# Patient Record
Sex: MALE | Race: WHITE | NOT HISPANIC OR LATINO | Employment: OTHER | URBAN - METROPOLITAN AREA
[De-identification: names, ages, dates, MRNs, and addresses within clinical notes are randomized per-mention and may not be internally consistent; named-entity substitution may affect disease eponyms.]

---

## 2019-04-29 ENCOUNTER — MEDICAL CORRESPONDENCE (OUTPATIENT)
Dept: HEALTH INFORMATION MANAGEMENT | Facility: CLINIC | Age: 64
End: 2019-04-29

## 2019-04-29 ENCOUNTER — TRANSFERRED RECORDS (OUTPATIENT)
Dept: HEALTH INFORMATION MANAGEMENT | Facility: CLINIC | Age: 64
End: 2019-04-29

## 2019-05-28 ENCOUNTER — TRANSFERRED RECORDS (OUTPATIENT)
Dept: HEALTH INFORMATION MANAGEMENT | Facility: CLINIC | Age: 64
End: 2019-05-28

## 2019-05-28 LAB
ALT SERPL-CCNC: 30 U/L (ref 14–63)
AST SERPL-CCNC: 20 U/L (ref 15–37)
CREAT SERPL-MCNC: 0.93 MG/DL (ref 0.67–1.17)
EJECTION FRACTION: NORMAL %
GFR SERPL CREATININE-BSD FRML MDRD: >60 ML/MIN/1.73ME2 (ref 60–150)
GLUCOSE SERPL-MCNC: 114 MG/DL (ref 74–100)
POTASSIUM SERPL-SCNC: 3.7 MMOL/L (ref 3.5–5.1)

## 2019-05-29 ENCOUNTER — TRANSFERRED RECORDS (OUTPATIENT)
Dept: HEALTH INFORMATION MANAGEMENT | Facility: CLINIC | Age: 64
End: 2019-05-29

## 2019-05-30 ENCOUNTER — TRANSFERRED RECORDS (OUTPATIENT)
Dept: HEALTH INFORMATION MANAGEMENT | Facility: CLINIC | Age: 64
End: 2019-05-30

## 2019-06-27 ENCOUNTER — TRANSFERRED RECORDS (OUTPATIENT)
Dept: HEALTH INFORMATION MANAGEMENT | Facility: CLINIC | Age: 64
End: 2019-06-27

## 2019-06-27 LAB
CREAT SERPL-MCNC: 0.94 MG/DL (ref 0.67–1.17)
GFR SERPL CREATININE-BSD FRML MDRD: >60 ML/MIN/1.73M^2 (ref 60–150)
GLUCOSE SERPL-MCNC: 109 MG/DL (ref 74–100)
POTASSIUM SERPL-SCNC: 4.3 MMOL/L (ref 3.5–5.1)

## 2020-01-28 ENCOUNTER — TRANSFERRED RECORDS (OUTPATIENT)
Dept: HEALTH INFORMATION MANAGEMENT | Facility: CLINIC | Age: 65
End: 2020-01-28

## 2020-02-10 ENCOUNTER — TRANSFERRED RECORDS (OUTPATIENT)
Dept: HEALTH INFORMATION MANAGEMENT | Facility: CLINIC | Age: 65
End: 2020-02-10

## 2020-07-29 ENCOUNTER — VIRTUAL VISIT (OUTPATIENT)
Dept: CARDIOLOGY | Facility: CLINIC | Age: 65
End: 2020-07-29
Payer: MEDICARE

## 2020-07-29 VITALS — WEIGHT: 235 LBS

## 2020-07-29 DIAGNOSIS — E78.5 HYPERLIPIDEMIA LDL GOAL <100: ICD-10-CM

## 2020-07-29 DIAGNOSIS — I10 BENIGN ESSENTIAL HYPERTENSION: ICD-10-CM

## 2020-07-29 DIAGNOSIS — I25.10 CORONARY ARTERY DISEASE INVOLVING NATIVE HEART WITHOUT ANGINA PECTORIS, UNSPECIFIED VESSEL OR LESION TYPE: Primary | ICD-10-CM

## 2020-07-29 PROBLEM — E78.2 MIXED HYPERLIPIDEMIA: Status: ACTIVE | Noted: 2020-01-28

## 2020-07-29 PROCEDURE — 99442 ZZC PHYSICIAN TELEPHONE EVALUATION 11-20 MIN: CPT | Performed by: INTERNAL MEDICINE

## 2020-07-29 RX ORDER — MULTIPLE VITAMINS W/ MINERALS TAB 9MG-400MCG
TAB ORAL DAILY
COMMUNITY

## 2020-07-29 RX ORDER — RAMIPRIL 5 MG/1
5 CAPSULE ORAL DAILY
COMMUNITY
Start: 2019-06-27 | End: 2020-10-19

## 2020-07-29 RX ORDER — GEMFIBROZIL 600 MG/1
600 TABLET, FILM COATED ORAL
COMMUNITY
End: 2020-10-19

## 2020-07-29 RX ORDER — AMOXICILLIN 500 MG
1200 CAPSULE ORAL 3 TIMES DAILY
COMMUNITY

## 2020-07-29 RX ORDER — EZETIMIBE 10 MG/1
10 TABLET ORAL DAILY
COMMUNITY
Start: 2020-02-03 | End: 2020-12-14

## 2020-07-29 RX ORDER — NITROGLYCERIN 0.4 MG/1
0.4 TABLET SUBLINGUAL EVERY 5 MIN PRN
COMMUNITY
End: 2022-08-03

## 2020-07-29 RX ORDER — ROSUVASTATIN CALCIUM 40 MG/1
40 TABLET, COATED ORAL DAILY
COMMUNITY
Start: 2020-02-03 | End: 2020-12-14

## 2020-07-29 RX ORDER — ACETAMINOPHEN 160 MG
2000 TABLET,DISINTEGRATING ORAL DAILY
COMMUNITY
Start: 2019-06-27

## 2020-07-29 RX ORDER — DIMENHYDRINATE 50 MG
1 TABLET ORAL DAILY
COMMUNITY

## 2020-07-29 RX ORDER — METOPROLOL SUCCINATE 50 MG/1
50 TABLET, EXTENDED RELEASE ORAL DAILY
COMMUNITY
Start: 2019-11-06 | End: 2021-03-19

## 2020-07-29 NOTE — LETTER
7/29/2020    [facility]  [address] [facility] [address]    RE: Willie Jarrett       Dear Colleague,    I had the pleasure of seeing Willie Jarrett in the [facility].    CARDIOLOGY CONSULT    REASON FOR CONSULT: coronary artery disease    PRIMARY CARE PHYSICIAN:  [facility]    HISTORY OF PRESENT ILLNESS:  Mr. Osei Jarrett is a pleasant 64-year-old male with past medical history significant for coronary artery disease, s/p multiple PCIs including PCI of the mid LAD (on 3/09/2001) with thrombectomy and PCI of the proximal LCX with kissing balloon to OM (on 6/03/2008) for acute inferior MI complicated by out of hospital cardiac arrest and most recent PCI with MARIANN x1 to the mid RCA (5/29/2019), hypertension, hyperlipidemia, diabetes mellitus (diet controlled), history of tobacco use (quit in 2008) .  Due to the COVID 19, this visit is conducted via telephone, with Willie's consent.  Today, Willie reports feeling very well.  He and his wife recently moved from [place] to the [facility] to be closer to family.  They now have several acres of land and are busy settling in.  He is quite active outside and denies any exertional chest pain, chest discomfort or shortness of breath.  He was switched from atorvastatin to rosuvastatin and Zetia and his LDL is now at goal.  He wonders if he still needs to take the Brilinta as it has been over a year since his PCI.  He does note he gets short of breath when he bends over to pick something up or tie his shoes.          PAST MEDICAL HISTORY:  1.  Coronary artery disease:  S/P multiple PCIs of mid LAD (2001), PCI of proximal LCx with kissing balloon to OM (2008) for acute inferior MI complicated by out of hospital cardiac arrest, PCI with MARIANN to RCA (5/2019).  Normal LV function by echocardiogram 5/2019 EF 60-65% with basal inferior wall hypokinesis  2.  Hypertension  3.  Hyperlipidemia  4.  Diabetes type II    MEDICATIONS:  Current Outpatient  Medications   Medication     ASPIRIN 81 PO     Cholecalciferol (VITAMIN D3) 50 MCG (2000 UT) CAPS     ezetimibe (ZETIA) 10 MG tablet     Flaxseed, Linseed, (FLAX SEED OIL) 1000 MG capsule     gemfibrozil (LOPID) 600 MG tablet     metoprolol succinate ER (TOPROL-XL) 50 MG 24 hr tablet     Omega-3 Fatty Acids (FISH OIL) 1200 MG capsule     ramipril (ALTACE) 5 MG capsule     rosuvastatin (CRESTOR) 40 MG tablet     ticagrelor (BRILINTA) 90 MG tablet     multivitamin w/minerals (MULTI-VITAMIN) tablet     nitroGLYcerin (NITROSTAT) 0.4 MG sublingual tablet     No current facility-administered medications for this visit.        ALLERGIES:  No Known Allergies    SOCIAL HISTORY:  I have reviewed this patient's social history and updated it with pertinent information if needed. Willie Jarrett      FAMILY HISTORY:  I have reviewed this patient's family history and updated it with pertinent information if needed.   No family history on file.    REVIEW OF SYSTEMS:  A complete ROS was obtained and the pertinent positives are outlined in the history of present illness above.  The remainder of systems is negative.    PHYSICAL EXAM:  Patient reported vitals   Weight:  235 pounds        DATA:  Reviewed in EPIC    ASSESSMENT:  1.  Coronary artery disease:  Multiple PCIs as outlined above.  Echocardiogram with normal LV function, basal inferior WMA by echo in 2019.  Currently stable without symptoms concerning for obstructive CAD  2.  Hypertension  3.  Hyperlipidemia:  At goal on combination zetia and crestor  4.  Diabetes type II    RECOMMENDATIONS:  1. Okay to stop brilinta.  Continue ASA indefinitely.  2. Continue zetia, crestor.    3.  Reviewed importance of regular exercise and a heart healthy diet.   4.   Plan for follow up in one year or before than as necessary.      Julita Guzmán MD  Cardiology - [facility]  Pager:  [phone]  July 29, 2020        Phone call duration: 11 minutes  Total provider time:  20 minutes      Thank you  for allowing me to participate in the care of your patient.    Sincerely,     Julita Guzmán MD     [facility]

## 2020-07-29 NOTE — PROGRESS NOTES
"  The patient has been notified of following:     \"This telephone visit will be conducted via a call between you and your physician/provider. We have found that certain health care needs can be provided without the need for a physical exam.  This service lets us provide the care you need with a short phone conversation.  If a prescription is necessary we can send it directly to your pharmacy.  If lab work is needed we can place an order for that and you can then stop by our lab to have the test done at a later time.    Telephone visits are billed at different rates depending on your insurance coverage. During this emergency period, for some insurers they may be billed the same as an in-person visit.  Please reach out to your insurance provider with any questions.    If during the course of the call the physician/provider feels a telephone visit is not appropriate, you will not be charged for this service.\"    Patient has given verbal consent for Telephone visit?  Yes    What phone number would you like to be contacted at? [phone]    How would you like to obtain your AVS? Mail a copy    CARDIOLOGY CONSULT    REASON FOR CONSULT: coronary artery disease    PRIMARY CARE PHYSICIAN:  [facility]    HISTORY OF PRESENT ILLNESS:  Mr. Osei Jarrett is a pleasant 64-year-old male with past medical history significant for coronary artery disease, s/p multiple PCIs including PCI of the mid LAD (on 3/09/2001) with thrombectomy and PCI of the proximal LCX with kissing balloon to OM (on 6/03/2008) for acute inferior MI complicated by out of hospital cardiac arrest and most recent PCI with MARIANN x1 to the mid RCA (5/29/2019), hypertension, hyperlipidemia, diabetes mellitus (diet controlled), history of tobacco use (quit in 2008) .  Due to the COVID 19, this visit is conducted via telephone, with Willie's consent.  Today, Willie reports feeling very well.  He and his wife recently moved from [place] to the [facility] to be " closer to family.  They now have several acres of land and are busy settling in.  He is quite active outside and denies any exertional chest pain, chest discomfort or shortness of breath.  He was switched from atorvastatin to rosuvastatin and Zetia and his LDL is now at goal.  He wonders if he still needs to take the Brilinta as it has been over a year since his PCI.  He does note he gets short of breath when he bends over to pick something up or tie his shoes.          PAST MEDICAL HISTORY:  1.  Coronary artery disease:  S/P multiple PCIs of mid LAD (2001), PCI of proximal LCx with kissing balloon to OM (2008) for acute inferior MI complicated by out of hospital cardiac arrest, PCI with MARIANN to RCA (5/2019).  Normal LV function by echocardiogram 5/2019 EF 60-65% with basal inferior wall hypokinesis  2.  Hypertension  3.  Hyperlipidemia  4.  Diabetes type II    MEDICATIONS:  Current Outpatient Medications   Medication     ASPIRIN 81 PO     Cholecalciferol (VITAMIN D3) 50 MCG (2000 UT) CAPS     ezetimibe (ZETIA) 10 MG tablet     Flaxseed, Linseed, (FLAX SEED OIL) 1000 MG capsule     gemfibrozil (LOPID) 600 MG tablet     metoprolol succinate ER (TOPROL-XL) 50 MG 24 hr tablet     Omega-3 Fatty Acids (FISH OIL) 1200 MG capsule     ramipril (ALTACE) 5 MG capsule     rosuvastatin (CRESTOR) 40 MG tablet     ticagrelor (BRILINTA) 90 MG tablet     multivitamin w/minerals (MULTI-VITAMIN) tablet     nitroGLYcerin (NITROSTAT) 0.4 MG sublingual tablet     No current facility-administered medications for this visit.        ALLERGIES:  No Known Allergies    SOCIAL HISTORY:  I have reviewed this patient's social history and updated it with pertinent information if needed. Willie Jarrett      FAMILY HISTORY:  I have reviewed this patient's family history and updated it with pertinent information if needed.   No family history on file.    REVIEW OF SYSTEMS:  A complete ROS was obtained and the pertinent positives are outlined in the  history of present illness above.  The remainder of systems is negative.    PHYSICAL EXAM:  Patient reported vitals   Weight:  235 pounds        DATA:  Reviewed in EPIC    ASSESSMENT:  1.  Coronary artery disease:  Multiple PCIs as outlined above.  Echocardiogram with normal LV function, basal inferior WMA by echo in 2019.  Currently stable without symptoms concerning for obstructive CAD  2.  Hypertension  3.  Hyperlipidemia:  At goal on combination zetia and crestor  4.  Diabetes type II    RECOMMENDATIONS:  1. Okay to stop brilinta.  Continue ASA indefinitely.  2. Continue zetia, crestor.    3.  Reviewed importance of regular exercise and a heart healthy diet.   4.   Plan for follow up in one year or before than as necessary.      Julita Guzmán MD  Cardiology - [facility]  Pager:  [phone]  July 29, 2020        Phone call duration: 11 minutes  Total provider time:  20 minutes

## 2020-08-03 ENCOUNTER — OFFICE VISIT (OUTPATIENT)
Dept: FAMILY MEDICINE | Facility: CLINIC | Age: 65
End: 2020-08-03
Payer: MEDICARE

## 2020-08-03 VITALS
OXYGEN SATURATION: 97 % | SYSTOLIC BLOOD PRESSURE: 108 MMHG | HEIGHT: 72 IN | RESPIRATION RATE: 16 BRPM | WEIGHT: 239 LBS | DIASTOLIC BLOOD PRESSURE: 72 MMHG | TEMPERATURE: 96.7 F | BODY MASS INDEX: 32.37 KG/M2 | HEART RATE: 68 BPM

## 2020-08-03 DIAGNOSIS — Z12.5 SCREENING FOR PROSTATE CANCER: ICD-10-CM

## 2020-08-03 DIAGNOSIS — I10 BENIGN ESSENTIAL HYPERTENSION: ICD-10-CM

## 2020-08-03 DIAGNOSIS — Z12.11 SCREEN FOR COLON CANCER: ICD-10-CM

## 2020-08-03 DIAGNOSIS — E78.2 MIXED HYPERLIPIDEMIA: ICD-10-CM

## 2020-08-03 DIAGNOSIS — I25.10 ATHEROSCLEROSIS OF NATIVE CORONARY ARTERY OF NATIVE HEART WITHOUT ANGINA PECTORIS: Primary | ICD-10-CM

## 2020-08-03 LAB
ALBUMIN SERPL-MCNC: 4.1 G/DL (ref 3.4–5)
ALBUMIN UR-MCNC: 30 MG/DL
ALP SERPL-CCNC: 66 U/L (ref 40–150)
ALT SERPL W P-5'-P-CCNC: 49 U/L (ref 0–70)
ANION GAP SERPL CALCULATED.3IONS-SCNC: 3 MMOL/L (ref 3–14)
APPEARANCE UR: CLEAR
AST SERPL W P-5'-P-CCNC: 28 U/L (ref 0–45)
BILIRUB DIRECT SERPL-MCNC: 0.2 MG/DL (ref 0–0.2)
BILIRUB SERPL-MCNC: 0.5 MG/DL (ref 0.2–1.3)
BILIRUB UR QL STRIP: NEGATIVE
BUN SERPL-MCNC: 21 MG/DL (ref 7–30)
CALCIUM SERPL-MCNC: 9.5 MG/DL (ref 8.5–10.1)
CHLORIDE SERPL-SCNC: 112 MMOL/L (ref 94–109)
CHOLEST SERPL-MCNC: 118 MG/DL
CO2 SERPL-SCNC: 27 MMOL/L (ref 20–32)
COLOR UR AUTO: YELLOW
CREAT SERPL-MCNC: 1.03 MG/DL (ref 0.66–1.25)
GFR SERPL CREATININE-BSD FRML MDRD: 75 ML/MIN/{1.73_M2}
GLUCOSE SERPL-MCNC: 113 MG/DL (ref 70–99)
GLUCOSE UR STRIP-MCNC: NEGATIVE MG/DL
HDLC SERPL-MCNC: 43 MG/DL
HGB UR QL STRIP: NEGATIVE
KETONES UR STRIP-MCNC: NEGATIVE MG/DL
LDLC SERPL CALC-MCNC: 45 MG/DL
LEUKOCYTE ESTERASE UR QL STRIP: NEGATIVE
MUCOUS THREADS #/AREA URNS LPF: PRESENT /LPF
NITRATE UR QL: NEGATIVE
NONHDLC SERPL-MCNC: 75 MG/DL
PH UR STRIP: 5 PH (ref 5–7)
POTASSIUM SERPL-SCNC: 4.6 MMOL/L (ref 3.4–5.3)
PROT SERPL-MCNC: 7.7 G/DL (ref 6.8–8.8)
PSA SERPL-ACNC: 0.24 UG/L (ref 0–4)
RBC #/AREA URNS AUTO: 1 /HPF (ref 0–2)
SODIUM SERPL-SCNC: 142 MMOL/L (ref 133–144)
SOURCE: ABNORMAL
SP GR UR STRIP: 1.02 (ref 1–1.03)
SQUAMOUS #/AREA URNS AUTO: <1 /HPF (ref 0–1)
TRIGL SERPL-MCNC: 149 MG/DL
UROBILINOGEN UR STRIP-MCNC: 0 MG/DL (ref 0–2)
WBC #/AREA URNS AUTO: 1 /HPF (ref 0–5)

## 2020-08-03 PROCEDURE — G0103 PSA SCREENING: HCPCS | Performed by: INTERNAL MEDICINE

## 2020-08-03 PROCEDURE — G0438 PPPS, INITIAL VISIT: HCPCS | Performed by: INTERNAL MEDICINE

## 2020-08-03 PROCEDURE — 80076 HEPATIC FUNCTION PANEL: CPT | Performed by: INTERNAL MEDICINE

## 2020-08-03 PROCEDURE — 81001 URINALYSIS AUTO W/SCOPE: CPT | Performed by: INTERNAL MEDICINE

## 2020-08-03 PROCEDURE — 36415 COLL VENOUS BLD VENIPUNCTURE: CPT | Performed by: INTERNAL MEDICINE

## 2020-08-03 PROCEDURE — 80061 LIPID PANEL: CPT | Performed by: INTERNAL MEDICINE

## 2020-08-03 PROCEDURE — 80048 BASIC METABOLIC PNL TOTAL CA: CPT | Performed by: INTERNAL MEDICINE

## 2020-08-03 ASSESSMENT — MIFFLIN-ST. JEOR: SCORE: 1902.1

## 2020-08-03 ASSESSMENT — ACTIVITIES OF DAILY LIVING (ADL): CURRENT_FUNCTION: NO ASSISTANCE NEEDED

## 2020-08-03 ASSESSMENT — PAIN SCALES - GENERAL: PAINLEVEL: NO PAIN (0)

## 2020-08-03 NOTE — H&P (VIEW-ONLY)
"SUBJECTIVE:   Willie Jarrett is a 64 year old male who presents for Preventive Visit.  Are you in the first 12 months of your Medicare coverage?  No    Healthy Habits:     In general, how would you rate your overall health?  Good    Frequency of exercise:  None    Do you usually eat at least 4 servings of fruit and vegetables a day, include whole grains    & fiber and avoid regularly eating high fat or \"junk\" foods?  No    Taking medications regularly:  Yes    Medication side effects:  None    Ability to successfully perform activities of daily living:  No assistance needed    Home Safety:  No safety concerns identified    Hearing Impairment:  No hearing concerns    In the past 6 months, have you been bothered by leaking of urine?  No    In general, how would you rate your overall mental or emotional health?  Excellent      PHQ-2 Total Score: 0    Additional concerns today:  No    Do you feel safe in your environment? Yes    Have you ever done Advance Care Planning? (For example, a Health Directive, POLST, or a discussion with a medical provider or your loved ones about your wishes): No, advance care planning information given to patient to review.  Patient plans to discuss their wishes with loved ones or provider.        Fall risk  Fallen 2 or more times in the past year?: No  Any fall with injury in the past year?: No    Cognitive Screening   1) Repeat 3 items (Leader, Season, Table)    2) Clock draw: NORMAL  3) 3 item recall: Recalls 3 objects  Results: 3 items recalled: COGNITIVE IMPAIRMENT LESS LIKELY    Mini-CogTM Copyright MARSHA Major. Licensed by the author for use in [facility]; reprinted with permission (ebony@.[facility]). All rights reserved.      Do you have sleep apnea, excessive snoring or daytime drowsiness?: no    Reviewed and updated as needed this visit by clinical staff  Tobacco  Allergies  Meds         Reviewed and updated as needed this visit by Provider        Social History     Tobacco Use "     Smoking status: Former Smoker     Smokeless tobacco: Never Used   Substance Use Topics     Alcohol use: Yes         Alcohol Use 8/3/2020   Prescreen: >3 drinks/day or >7 drinks/week? No           -------------------------------------    Current providers sharing in care for this patient include:   Patient Care Team:  Clinic, [facility] as PCP - General (Gastroenterology)    The following health maintenance items are reviewed in Epic and correct as of today:  Health Maintenance   Topic Date Due     HEPATITIS C SCREENING  1955     ADVANCE CARE PLANNING  1955     COLORECTAL CANCER SCREENING  10/27/1963     ZOSTER IMMUNIZATION (1 of 2) 10/27/2003     LIPID  06/04/2013     MEDICARE ANNUAL WELLNESS VISIT  10/27/2018     FALL RISK ASSESSMENT  10/27/2018     PNEUMOCOCCAL IMMUNIZATION 65+ LOW/MEDIUM RISK (1 of 2 - PCV13) 10/27/2018     AORTIC ANEURYSM SCREENING (SYSTEM ASSIGNED)  10/27/2018     PHQ-2  01/01/2020     INFLUENZA VACCINE (1) 09/01/2020     DTAP/TDAP/TD IMMUNIZATION (2 - Td) 07/15/2023     IPV IMMUNIZATION  Aged Out     MENINGITIS IMMUNIZATION  Aged Out     HEPATITIS B IMMUNIZATION  Aged Out     Lab work is in process  BP Readings from Last 3 Encounters:   08/03/20 108/72    Wt Readings from Last 3 Encounters:   08/03/20 108.4 kg (239 lb)   07/29/20 106.6 kg (235 lb)                  Patient Active Problem List   Diagnosis     Coronary atherosclerosis     Encounter for colonoscopy in patient with family history of colon cancer     Mixed hyperlipidemia     Postsurgical percutaneous transluminal coronary angioplasty status     Hyperlipidemia     No past surgical history on file.    Social History     Tobacco Use     Smoking status: Former Smoker     Smokeless tobacco: Never Used   Substance Use Topics     Alcohol use: Yes     No family history on file.      Current Outpatient Medications   Medication Sig Dispense Refill     ASPIRIN 81 PO        Cholecalciferol (VITAMIN D3) 50 MCG (2000 UT) CAPS Take  2,000 Units by mouth daily       ezetimibe (ZETIA) 10 MG tablet Take 10 mg by mouth daily       Flaxseed, Linseed, (FLAX SEED OIL) 1000 MG capsule Take 1 capsule by mouth daily Taking 1400mg twice daily       gemfibrozil (LOPID) 600 MG tablet Take 600 mg by mouth 2 times daily (before meals)       metoprolol succinate ER (TOPROL-XL) 50 MG 24 hr tablet Take 50 mg by mouth daily       multivitamin w/minerals (MULTI-VITAMIN) tablet Take by mouth daily       Omega-3 Fatty Acids (FISH OIL) 1200 MG capsule Take 1,200 mg by mouth 3 times daily       ramipril (ALTACE) 5 MG capsule Take 5 mg by mouth daily       rosuvastatin (CRESTOR) 40 MG tablet Take 40 mg by mouth daily       ticagrelor (BRILINTA) 90 MG tablet Take 90 mg by mouth 2 times daily       nitroGLYcerin (NITROSTAT) 0.4 MG sublingual tablet Place 0.4 mg under the tongue every 5 minutes as needed for chest pain For chest pain place 1 tablet under the tongue every 5 minutes for 3 doses. If symptoms persist 5 minutes after 1st dose call 911.       No Known Allergies      Review of Systems  CONSTITUTIONAL: NEGATIVE for fever, chills, change in weight  INTEGUMENTARY/SKIN: NEGATIVE for worrisome rashes, moles or lesions  EYES: NEGATIVE for vision changes or irritation  ENT/MOUTH: NEGATIVE for ear, mouth and throat problems  RESP: NEGATIVE for significant cough or SOB  CV: Patient denies chest pain, arrhythmia, syncope.  His history of previous angioplasties about 5 years ago.  GI: NEGATIVE for nausea, abdominal pain, heartburn, or change in bowel habits.  History of benign colon polyps 3 years ago.  Due for colonoscopy.  : NEGATIVE for frequency, dysuria, or hematuria  MUSCULOSKELETAL: NEGATIVE for significant arthralgias or myalgia  NEURO: NEGATIVE for weakness, dizziness or paresthesias  ENDOCRINE: NEGATIVE for temperature intolerance, skin/hair changes  HEME: NEGATIVE for bleeding problems  PSYCHIATRIC: NEGATIVE for changes in mood or affect    OBJECTIVE:   BP  108/72 (BP Location: Right arm, Patient Position: Sitting, Cuff Size: Adult Regular)   Pulse 68   Temp 96.7  F (35.9  C) (Temporal)   Resp 16   Ht 1.829 m (6')   Wt 108.4 kg (239 lb)   SpO2 97%   BMI 32.41 kg/m   Estimated body mass index is 32.41 kg/m  as calculated from the following:    Height as of this encounter: 1.829 m (6').    Weight as of this encounter: 108.4 kg (239 lb).  Physical Exam  GENERAL: healthy, alert and no distress  EYES: Eyes grossly normal to inspection, PERRL and conjunctivae and sclerae normal  HENT: ear canals and TM's normal, nose and mouth without ulcers or lesions  NECK: no adenopathy, no asymmetry, masses, or scars and thyroid normal to palpation  RESP: lungs clear to auscultation - no rales, rhonchi or wheezes  CV: regular rate and rhythm, normal S1 S2, no S3 or S4, no murmur, click or rub, no peripheral edema and peripheral pulses strong  ABDOMEN: soft, nontender, no hepatosplenomegaly, no masses and bowel sounds normal  MS: no gross musculoskeletal defects noted, no edema  SKIN: no suspicious lesions or rashes  NEURO: Normal strength and tone, mentation intact and speech normal  PSYCH: mentation appears normal, affect normal/bright    Diagnostic Test Results:  No results found for this or any previous visit (from the past 24 hour(s)).    ASSESSMENT / PLAN:       ICD-10-CM    1. Atherosclerosis of native coronary artery of native heart without angina pectoris  I25.10 Hepatic panel   2. Mixed hyperlipidemia  E78.2 Lipid Profile   3. Benign essential hypertension  I10 Basic metabolic panel  (Ca, Cl, CO2, Creat, Gluc, K, Na, BUN)     *UA reflex to Microscopic and Culture ([place]; [facility]; [facility]; [employer]; [facility]; [facility]; [place]; [place])   4. Screening for prostate cancer  Z12.5 PSA, screen   5. Screen for colon cancer  Z12.11 GASTROENTEROLOGY ADULT REF PROCEDURE ONLY       COUNSELING:  Reviewed preventive health counseling, as reflected  in patient instructions       Regular exercise       Healthy diet/nutrition       Vision screening       Hearing screening       Dental care       Bladder control       Colon cancer screening       Prostate cancer screening    Estimated body mass index is 32.41 kg/m  as calculated from the following:    Height as of this encounter: 1.829 m (6').    Weight as of this encounter: 108.4 kg (239 lb).    Weight management plan: Discussed healthy diet and exercise guidelines     reports that he has quit smoking. He has never used smokeless tobacco.      Appropriate preventive services were discussed with this patient, including applicable screening as appropriate for cardiovascular disease, diabetes, osteopenia/osteoporosis, and glaucoma.  As appropriate for age/gender, discussed screening for colorectal cancer, prostate cancer, breast cancer, and cervical cancer. Checklist reviewing preventive services available has been given to the patient.    Reviewed patients plan of care and provided an AVS. The Basic Care Plan (routine screening as documented in Health Maintenance) for Willie meets the Care Plan requirement. This Care Plan has been established and reviewed with the Patient.    Counseling Resources:  ATP IV Guidelines  Pooled Cohorts Equation Calculator  Breast Cancer Risk Calculator  FRAX Risk Assessment  ICSI Preventive Guidelines  Dietary Guidelines for Americans, 2010  USDA's MyPlate  ASA Prophylaxis  Lung CA Screening    Devon Amin DO  [facility]    Identified Health Risks:

## 2020-08-03 NOTE — LETTER
August 6, 2020      Willie Jarrett  [address]   [place] [address]        Dear ,    We are writing to inform you of your test results.    The urine sample is unremarkable.   Prostate-specific antigen is consistent with a low risk of prostate cancer.   Liver tests are normal.   Cholesterol is well controlled with an LDL of 45.   Chemistry panel shows a minimally elevated blood sugar of 113 which is normal in a nonfasting state.     You will be contacted with any outstanding results as they are available.   Feel free to contact me via the office or My Chart if you have any questions regarding the above.     Resulted Orders   Lipid Profile   Result Value Ref Range    Cholesterol 118 <200 mg/dL    Triglycerides 149 <150 mg/dL    HDL Cholesterol 43 >39 mg/dL    LDL Cholesterol Calculated 45 <100 mg/dL      Comment:      Desirable:       <100 mg/dl    Non HDL Cholesterol 75 <130 mg/dL   Basic metabolic panel  (Ca, Cl, CO2, Creat, Gluc, K, Na, BUN)   Result Value Ref Range    Sodium 142 133 - 144 mmol/L    Potassium 4.6 3.4 - 5.3 mmol/L    Chloride 112 (H) 94 - 109 mmol/L    Carbon Dioxide 27 20 - 32 mmol/L    Anion Gap 3 3 - 14 mmol/L    Glucose 113 (H) 70 - 99 mg/dL    Urea Nitrogen 21 7 - 30 mg/dL    Creatinine 1.03 0.66 - 1.25 mg/dL    GFR Estimate 75 >60 mL/min/[1.73_m2]      Comment:      Non  GFR Calc  Starting 12/18/2018, serum creatinine based estimated GFR (eGFR) will be   calculated using the Chronic Kidney Disease Epidemiology Collaboration   (CKD-EPI) equation.      GFR Estimate If Black 87 >60 mL/min/[1.73_m2]      Comment:       GFR Calc  Starting 12/18/2018, serum creatinine based estimated GFR (eGFR) will be   calculated using the Chronic Kidney Disease Epidemiology Collaboration   (CKD-EPI) equation.      Calcium 9.5 8.5 - 10.1 mg/dL   Hepatic panel   Result Value Ref Range    Bilirubin Direct 0.2 0.0 - 0.2 mg/dL    Bilirubin Total 0.5 0.2 - 1.3 mg/dL    Albumin  4.1 3.4 - 5.0 g/dL    Protein Total 7.7 6.8 - 8.8 g/dL    Alkaline Phosphatase 66 40 - 150 U/L    ALT 49 0 - 70 U/L    AST 28 0 - 45 U/L   PSA, screen   Result Value Ref Range    PSA 0.24 0 - 4 ug/L      Comment:      Assay Method:  Chemiluminescence using Siemens Vista analyzer   *UA reflex to Microscopic and Culture ([place]; [facility]; [facility]; [facility]; [facility] [place]; [facility]; [place]; [place])   Result Value Ref Range    Color Urine Yellow     Appearance Urine Clear     Glucose Urine Negative NEG^Negative mg/dL    Bilirubin Urine Negative NEG^Negative    Ketones Urine Negative NEG^Negative mg/dL    Specific Gravity Urine 1.016 1.003 - 1.035    Blood Urine Negative NEG^Negative    pH Urine 5.0 5.0 - 7.0 pH    Protein Albumin Urine 30 (A) NEG^Negative mg/dL    Urobilinogen mg/dL 0.0 0.0 - 2.0 mg/dL    Nitrite Urine Negative NEG^Negative    Leukocyte Esterase Urine Negative NEG^Negative    Source Unspecified Urine     RBC Urine 1 0 - 2 /HPF    WBC Urine 1 0 - 5 /HPF    Squamous Epithelial /HPF Urine <1 0 - 1 /HPF    Mucous Urine Present (A) NEG^Negative /LPF       If you have any questions or concerns, please call the clinic at the number listed above.       Sincerely,        Devon Amin DO

## 2020-08-06 ENCOUNTER — TELEPHONE (OUTPATIENT)
Dept: FAMILY MEDICINE | Facility: OTHER | Age: 65
End: 2020-08-06

## 2020-08-06 DIAGNOSIS — Z11.59 ENCOUNTER FOR SCREENING FOR OTHER VIRAL DISEASES: Primary | ICD-10-CM

## 2020-08-06 NOTE — RESULT ENCOUNTER NOTE
Dear Willie, your recent test results are attached.  The urine sample is unremarkable.  Prostate-specific antigen is consistent with a low risk of prostate cancer.  Liver tests are normal.  Cholesterol is well controlled with an LDL of 45.  Chemistry panel shows a minimally elevated blood sugar of 113 which is normal in a nonfasting state.    You will be contacted with any outstanding results as they are available.  Feel free to contact me via the office or My Chart if you have any questions regarding the above.

## 2020-08-06 NOTE — LETTER
[facility]  [address] [facility] [address]  [phone]        August 6, 2020    Willie Jarrett  [address] [facility] [address]

## 2020-08-06 NOTE — TELEPHONE ENCOUNTER
Date of colonoscopy/EGD: 8/20/20  Surgeon: Dr. Gerard  Prep:Miralax  Packet:Colonoscopy/EGD instructions mailed to patient's home address.   Date: 8/6/2020      Surgery Scheduler

## 2020-08-06 NOTE — LETTER

## 2020-08-17 DIAGNOSIS — Z11.59 ENCOUNTER FOR SCREENING FOR OTHER VIRAL DISEASES: ICD-10-CM

## 2020-08-17 PROCEDURE — U0003 INFECTIOUS AGENT DETECTION BY NUCLEIC ACID (DNA OR RNA); SEVERE ACUTE RESPIRATORY SYNDROME CORONAVIRUS 2 (SARS-COV-2) (CORONAVIRUS DISEASE [COVID-19]), AMPLIFIED PROBE TECHNIQUE, MAKING USE OF HIGH THROUGHPUT TECHNOLOGIES AS DESCRIBED BY CMS-2020-01-R: HCPCS | Performed by: SURGERY

## 2020-08-18 LAB
SARS-COV-2 RNA SPEC QL NAA+PROBE: NOT DETECTED
SPECIMEN SOURCE: NORMAL

## 2020-08-19 ENCOUNTER — ANESTHESIA EVENT (OUTPATIENT)
Dept: GASTROENTEROLOGY | Facility: CLINIC | Age: 65
End: 2020-08-19
Payer: MEDICARE

## 2020-08-19 SDOH — HEALTH STABILITY: MENTAL HEALTH: CURRENT SMOKER: 0

## 2020-08-19 ASSESSMENT — LIFESTYLE VARIABLES: TOBACCO_USE: 1

## 2020-08-19 NOTE — ANESTHESIA PREPROCEDURE EVALUATION
Anesthesia Pre-Procedure Evaluation    Patient: TAMMIE Jarrett   MRN: [id number] : 1955          Preoperative Diagnosis: Special screening for malignant neoplasms, colon [Z12.11]    Procedure(s):  Colonoscopy with possible biopsy and/or polypectomy    History reviewed. No pertinent past medical history.  History reviewed. No pertinent surgical history.    Anesthesia Evaluation     . Pt has had prior anesthetic. Type: MAC           ROS/MED HX    ENT/Pulmonary:     (+)tobacco use, Past use , . .    Neurologic:  - neg neurologic ROS     Cardiovascular:     (+) Dyslipidemia, --CAD, --stent,  1 Drug Eluting Stent .. : . . . :. . Previous cardiac testing Echodate:6-37-95vajbjtm:EF 60-65%date: results:ECG reviewed date:19 results:Sr-61 date: results:          METS/Exercise Tolerance:     Hematologic:  - neg hematologic  ROS       Musculoskeletal:  - neg musculoskeletal ROS       GI/Hepatic:     (+) bowel prep,       Renal/Genitourinary:  - ROS Renal section negative       Endo:  - neg endo ROS       Psychiatric:  - neg psychiatric ROS       Infectious Disease:  - neg infectious disease ROS       Malignancy:      - no malignancy   Other:    - neg other ROS                      Physical Exam  Normal systems: cardiovascular, pulmonary and dental    Airway   Mallampati: II  TM distance: <3 FB  Neck ROM: full    Dental     Cardiovascular   Rhythm and rate: regular and normal      Pulmonary    breath sounds clear to auscultation            Lab Results   Component Value Date    HGB 11.8 (L) 2008    HCT 34.9 (L) 2008     2008     2020    POTASSIUM 4.6 2020    CHLORIDE 112 (H) 2020    CO2 27 2020    BUN 21 2020    CR 1.03 2020     (H) 2020    RICHAR 9.5 2020    ALBUMIN 4.1 2020    PROTTOTAL 7.7 2020    ALT 49 2020    AST 28 2020    ALKPHOS 66 2020    BILITOTAL 0.5 2020    PTT 28 2008        Preop Vitals  BP Readings from Last 3 Encounters:   08/03/20 108/72    Pulse Readings from Last 3 Encounters:   08/03/20 68      Resp Readings from Last 3 Encounters:   08/03/20 16    SpO2 Readings from Last 3 Encounters:   08/03/20 97%      Temp Readings from Last 1 Encounters:   08/03/20 96.7  F (35.9  C) (Temporal)    Ht Readings from Last 1 Encounters:   08/03/20 1.829 m (6')      Wt Readings from Last 1 Encounters:   08/03/20 108.4 kg (239 lb)    Estimated body mass index is 32.41 kg/m  as calculated from the following:    Height as of 8/3/20: 1.829 m (6').    Weight as of 8/3/20: 108.4 kg (239 lb).       Anesthesia Plan      History & Physical Review  History and physical reviewed and following examination; no interval change.    ASA Status:  2 .    NPO Status:  > 6 hours    Plan for MAC with Intravenous and Propofol induction. Maintenance will be TIVA.  Reason for MAC:  Deep or markedly invasive procedure (G8)      The patient is not a current smoker      Postoperative Care      Consents  Anesthetic plan, risks, benefits and alternatives discussed with:  Patient.  Use of blood products discussed: No .   .                 ARI Morrow CRNA

## 2020-08-20 ENCOUNTER — HOSPITAL ENCOUNTER (OUTPATIENT)
Facility: CLINIC | Age: 65
Discharge: HOME | End: 2020-08-20
Attending: SURGERY | Admitting: SURGERY
Payer: MEDICARE

## 2020-08-20 ENCOUNTER — ANESTHESIA (OUTPATIENT)
Dept: GASTROENTEROLOGY | Facility: CLINIC | Age: 65
End: 2020-08-20
Payer: MEDICARE

## 2020-08-20 VITALS
DIASTOLIC BLOOD PRESSURE: 84 MMHG | OXYGEN SATURATION: 95 % | HEART RATE: 62 BPM | RESPIRATION RATE: 16 BRPM | TEMPERATURE: 97.8 F | SYSTOLIC BLOOD PRESSURE: 111 MMHG

## 2020-08-20 LAB — COLONOSCOPY: NORMAL

## 2020-08-20 PROCEDURE — 25800030 ZZH RX IP 258 OP 636: Performed by: SURGERY

## 2020-08-20 PROCEDURE — 45378 DIAGNOSTIC COLONOSCOPY: CPT | Performed by: SURGERY

## 2020-08-20 PROCEDURE — 25000125 ZZHC RX 250: Performed by: SURGERY

## 2020-08-20 PROCEDURE — 37000008 ZZH ANESTHESIA TECHNICAL FEE, 1ST 30 MIN: Performed by: SURGERY

## 2020-08-20 PROCEDURE — G0105 COLORECTAL SCRN; HI RISK IND: HCPCS | Performed by: SURGERY

## 2020-08-20 PROCEDURE — 37000009 ZZH ANESTHESIA TECHNICAL FEE, EACH ADDTL 15 MIN: Performed by: SURGERY

## 2020-08-20 PROCEDURE — 25000128 H RX IP 250 OP 636: Performed by: NURSE ANESTHETIST, CERTIFIED REGISTERED

## 2020-08-20 PROCEDURE — 25000125 ZZHC RX 250: Performed by: NURSE ANESTHETIST, CERTIFIED REGISTERED

## 2020-08-20 RX ORDER — PROPOFOL 10 MG/ML
INJECTION, EMULSION INTRAVENOUS PRN
Status: DISCONTINUED | OUTPATIENT
Start: 2020-08-20 | End: 2020-08-20

## 2020-08-20 RX ORDER — MEPERIDINE HYDROCHLORIDE 25 MG/ML
12.5 INJECTION INTRAMUSCULAR; INTRAVENOUS; SUBCUTANEOUS
Status: DISCONTINUED | OUTPATIENT
Start: 2020-08-20 | End: 2020-08-20 | Stop reason: HOSPADM

## 2020-08-20 RX ORDER — SODIUM CHLORIDE, SODIUM LACTATE, POTASSIUM CHLORIDE, CALCIUM CHLORIDE 600; 310; 30; 20 MG/100ML; MG/100ML; MG/100ML; MG/100ML
INJECTION, SOLUTION INTRAVENOUS CONTINUOUS
Status: DISCONTINUED | OUTPATIENT
Start: 2020-08-20 | End: 2020-08-20 | Stop reason: HOSPADM

## 2020-08-20 RX ORDER — LIDOCAINE 40 MG/G
CREAM TOPICAL
Status: DISCONTINUED | OUTPATIENT
Start: 2020-08-20 | End: 2020-08-20 | Stop reason: HOSPADM

## 2020-08-20 RX ORDER — ONDANSETRON 2 MG/ML
4 INJECTION INTRAMUSCULAR; INTRAVENOUS EVERY 30 MIN PRN
Status: DISCONTINUED | OUTPATIENT
Start: 2020-08-20 | End: 2020-08-20 | Stop reason: HOSPADM

## 2020-08-20 RX ORDER — PROPOFOL 10 MG/ML
INJECTION, EMULSION INTRAVENOUS CONTINUOUS PRN
Status: DISCONTINUED | OUTPATIENT
Start: 2020-08-20 | End: 2020-08-20

## 2020-08-20 RX ORDER — ONDANSETRON 4 MG/1
4 TABLET, ORALLY DISINTEGRATING ORAL EVERY 30 MIN PRN
Status: DISCONTINUED | OUTPATIENT
Start: 2020-08-20 | End: 2020-08-20 | Stop reason: HOSPADM

## 2020-08-20 RX ORDER — LIDOCAINE HYDROCHLORIDE 20 MG/ML
INJECTION, SOLUTION INFILTRATION; PERINEURAL PRN
Status: DISCONTINUED | OUTPATIENT
Start: 2020-08-20 | End: 2020-08-20

## 2020-08-20 RX ORDER — NALOXONE HYDROCHLORIDE 0.4 MG/ML
.1-.4 INJECTION, SOLUTION INTRAMUSCULAR; INTRAVENOUS; SUBCUTANEOUS
Status: DISCONTINUED | OUTPATIENT
Start: 2020-08-20 | End: 2020-08-20 | Stop reason: HOSPADM

## 2020-08-20 RX ADMIN — PROPOFOL 150 MCG/KG/MIN: 10 INJECTION, EMULSION INTRAVENOUS at 08:15

## 2020-08-20 RX ADMIN — SODIUM CHLORIDE, POTASSIUM CHLORIDE, SODIUM LACTATE AND CALCIUM CHLORIDE: 600; 310; 30; 20 INJECTION, SOLUTION INTRAVENOUS at 08:09

## 2020-08-20 RX ADMIN — PROPOFOL 70 MG: 10 INJECTION, EMULSION INTRAVENOUS at 08:15

## 2020-08-20 RX ADMIN — LIDOCAINE HYDROCHLORIDE 40 MG: 20 INJECTION, SOLUTION INFILTRATION; PERINEURAL at 08:15

## 2020-08-20 RX ADMIN — LIDOCAINE HYDROCHLORIDE 1 ML: 10 INJECTION, SOLUTION EPIDURAL; INFILTRATION; INTRACAUDAL; PERINEURAL at 08:09

## 2020-08-20 RX ADMIN — PROPOFOL 30 MG: 10 INJECTION, EMULSION INTRAVENOUS at 08:17

## 2020-08-20 NOTE — DISCHARGE INSTRUCTIONS
[facility]    Home Care Following Endoscopy          Activity:    You have just undergone an endoscopic procedure usually performed with conscious sedation.  Do not work or operate machinery (including a car) for at least 12 hours.      I encourage you to walk and attempt to pass this air as soon as possible.    Diet:    Return to the diet you were on before your procedure but eat lightly for the first 12-24 hours.    Drink plenty of water.    Resume any regular medications unless otherwise advised by your physician.  Please begin any new medication prescribed as a result of your procedure as directed by your physician.     If you had any biopsy or polyp removed please refrain from aspirin or aspirin products for 2 days.  If on Coumadin please restart as instructed by your physician.   Pain:    You may take Tylenol as needed for pain.  Expected Recovery:  You can expect some mild abdominal fullness and/or discomfort due to the air used to inflate your intestinal tract.     Call Your Physician if You Have:    After Colonoscopy:  o Worsening persisting abdominal pain which is worse with activity.  o Fevers (>101 degrees F), chills or shakes.  o Passage of continued blood with bowel movements.   Any questions or concerns about your recovery, please call [phone] or after hours [phone] Nurse Advice Line.    Follow-up Care:    You should receive a call or letter with your results within 1 week. Please call if you have not received a notification of your results.  If asked to return to clinic please make an appointment 1 week after your procedure.  Call [phone].

## 2020-08-20 NOTE — ANESTHESIA CARE TRANSFER NOTE
Patient: TAMMIE Jarrett    Procedure(s):  Colonoscopy    Diagnosis: Special screening for malignant neoplasms, colon [Z12.11]  Diagnosis Additional Information: No value filed.    Anesthesia Type:   MAC     Note:  Airway :Face Mask  Patient transferred to:Phase II  Handoff Report: Identifed the Patient, Identified the Reponsible Provider, Reviewed the pertinent medical history, Discussed the surgical course, Reviewed Intra-OP anesthesia mangement and issues during anesthesia, Set expectations for post-procedure period and Allowed opportunity for questions and acknowledgement of understanding      Vitals: (Last set prior to Anesthesia Care Transfer)    CRNA VITALS  8/20/2020 0806 - 8/20/2020 0841      8/20/2020             Resp Rate (observed):  (!) 3                Electronically Signed By: ARI Morrow CRNA  August 20, 2020  8:41 AM

## 2020-08-20 NOTE — ANESTHESIA POSTPROCEDURE EVALUATION
Patient: TAMMIE Jarrett    Procedure(s):  Colonoscopy    Diagnosis:Special screening for malignant neoplasms, colon [Z12.11]  Diagnosis Additional Information: No value filed.    Anesthesia Type:  MAC    Note:  Anesthesia Post Evaluation    Patient location during evaluation: Phase 2 and Bedside  Patient participation: Able to fully participate in evaluation  Level of consciousness: awake and alert  Pain management: adequate  Airway patency: patent  Cardiovascular status: acceptable  Respiratory status: acceptable  Hydration status: acceptable  PONV: none     Anesthetic complications: None          Last vitals:  Vitals:    08/20/20 0755 08/20/20 0839   BP: 126/86 113/68   Pulse:  72   Resp: 16 16   Temp: 97.8  F (36.6  C)    SpO2: 97% 99%         Electronically Signed By: ARI Morrow CRNA  August 20, 2020  8:42 AM

## 2020-08-20 NOTE — INTERVAL H&P NOTE
last colonoscopy in 2017 - 3 polyps at the time; ?famhx; ?PSH    The History and Physical has been reviewed, the patient has been examined and no changes have occurred in the patient's condition since the H & P was completed.       [facility]o, DO

## 2020-09-15 ENCOUNTER — NURSE TRIAGE (OUTPATIENT)
Dept: NURSING | Facility: CLINIC | Age: 65
End: 2020-09-15

## 2020-09-15 NOTE — TELEPHONE ENCOUNTER
"Pt requests \"[facility] [place].\"    Rx's evidently have been transmitted to a different [facility].  Explained that any [facility] can pull his Rx from any other [facility] via computer system (unless controlled).  Pt verbalizes understanding; however still hopes for all his future refills to occur at the [facility].    Thank you-    [facility] Nurse Advisor     Additional Information    Caller has NON-URGENT medication question about med that PCP prescribed and triager unable to answer question     Pt asks that all future refills [facility] and [facility] else.    Protocols used: MEDICATION QUESTION CALL-A-OH      "

## 2020-09-15 NOTE — TELEPHONE ENCOUNTER
Lyn [facility] RN [place] 2 minutes ago (9:00 AM)       CLINIC ACTION NEEDED ....   Thank you-    Routing comment

## 2020-10-19 DIAGNOSIS — E78.2 MIXED HYPERLIPIDEMIA: Primary | ICD-10-CM

## 2020-10-19 DIAGNOSIS — I10 BENIGN ESSENTIAL HYPERTENSION: ICD-10-CM

## 2020-10-19 RX ORDER — RAMIPRIL 5 MG/1
5 CAPSULE ORAL DAILY
Qty: 90 CAPSULE | Refills: 3 | Status: SHIPPED | OUTPATIENT
Start: 2020-10-19 | End: 2021-03-19

## 2020-10-19 RX ORDER — GEMFIBROZIL 600 MG/1
600 TABLET, FILM COATED ORAL
Qty: 90 TABLET | Refills: 3 | Status: SHIPPED | OUTPATIENT
Start: 2020-10-19 | End: 2021-03-19

## 2020-10-19 NOTE — TELEPHONE ENCOUNTER
Reason for Call:  Medication or medication refill:    Do you use a [facility]?  Name of the pharmacy and phone number for the current request:  [facility] [place]    Name of the medication requested: ramipril (ALTACE) 5 MG capsule, gemfibrozil (LOPID) 600 MG tablet, ezetimibe (ZETIA) 10 MG tablet, rosuvastatin (CRESTOR) 40 MG tablet,  metoprolol succinate ER (TOPROL-XL) 50 MG 24 hr tablet      Other request: Ron called in for his refill and was told by the pharmacy that Dr Amin is not on any of his medication as a provider, Willie needs new prescriptions sent to the pharmacy from Dr Amin for the following medications:    ramipril (ALTACE) 5 MG capsule  gemfibrozil (LOPID) 600 MG tablet - patient is out of this medication and needs this one as soon as possible.    He is getting a refill from his previous provider today for these medications but will need new prescriptions sent so his future refills to go to Dr Amin.    ezetimibe (ZETIA) 10 MG tablet  rosuvastatin (CRESTOR) 40 MG tablet  metoprolol succinate ER (TOPROL-XL) 50 MG 24 hr tablet          Can we leave a detailed message on this number? YES    Phone number patient can be reached at: Home number on file [phone] (home)    Best Time:     Call taken on 10/19/2020 at 8:47 AM by Elida Jiménez

## 2020-12-06 ENCOUNTER — HEALTH MAINTENANCE LETTER (OUTPATIENT)
Age: 65
End: 2020-12-06

## 2020-12-13 DIAGNOSIS — E78.2 MIXED HYPERLIPIDEMIA: Primary | ICD-10-CM

## 2020-12-14 RX ORDER — ROSUVASTATIN CALCIUM 40 MG/1
TABLET, COATED ORAL
Qty: 90 TABLET | Refills: 1 | Status: SHIPPED | OUTPATIENT
Start: 2020-12-14 | End: 2021-03-18

## 2020-12-14 RX ORDER — EZETIMIBE 10 MG/1
TABLET ORAL
Qty: 90 TABLET | Refills: 1 | Status: SHIPPED | OUTPATIENT
Start: 2020-12-14 | End: 2021-03-18

## 2020-12-14 NOTE — TELEPHONE ENCOUNTER
Routing refill request to provider for review/approval because:  Medication is reported/historical    SHO CrowN, RN  [facility]

## 2021-03-18 DIAGNOSIS — E78.2 MIXED HYPERLIPIDEMIA: ICD-10-CM

## 2021-03-18 DIAGNOSIS — I10 BENIGN ESSENTIAL HYPERTENSION: ICD-10-CM

## 2021-03-18 NOTE — TELEPHONE ENCOUNTER
Switching to [facility] order pharmacy.    ezetimibe (ZETIA) 10 MG tablet      Last Written Prescription Date:  12/14/2020  Last Fill Quantity: 90,   # refills: 1  Last Office Visit: 8/3/2020  Future Office visit:         rosuvastatin (CRESTOR) 40 MG tablet      Last Written Prescription Date:  12/14/2020  Last Fill Quantity: 90,   # refills: 1  Last Office Visit: 8/3/2020  Future Office visit:         gemfibrozil (LOPID) 600 MG tablet      Last Written Prescription Date:  10/19/2020  Last Fill Quantity: 90,   # refills: 3  Last Office Visit: 8/3/2020  Future Office visit:         ramipril (ALTACE) 5 MG capsule      Last Written Prescription Date:  10/19/2020  Last Fill Quantity: 90,   # refills: 3  Last Office Visit: 8/3/2020  Future Office visit:

## 2021-03-19 RX ORDER — EZETIMIBE 10 MG/1
10 TABLET ORAL DAILY
Qty: 90 TABLET | Refills: 1 | Status: SHIPPED | OUTPATIENT
Start: 2021-03-19 | End: 2021-08-18

## 2021-03-19 RX ORDER — ROSUVASTATIN CALCIUM 40 MG/1
TABLET, COATED ORAL
Qty: 90 TABLET | Refills: 1 | Status: SHIPPED | OUTPATIENT
Start: 2021-03-19 | End: 2021-08-18

## 2021-03-19 RX ORDER — GEMFIBROZIL 600 MG/1
600 TABLET, FILM COATED ORAL
Qty: 90 TABLET | Refills: 1 | Status: SHIPPED | OUTPATIENT
Start: 2021-03-19 | End: 2021-04-14

## 2021-03-19 RX ORDER — METOPROLOL SUCCINATE 50 MG/1
50 TABLET, EXTENDED RELEASE ORAL DAILY
Qty: 90 TABLET | Refills: 1 | Status: SHIPPED | OUTPATIENT
Start: 2021-03-19 | End: 2021-08-18

## 2021-03-19 RX ORDER — RAMIPRIL 5 MG/1
5 CAPSULE ORAL DAILY
Qty: 90 CAPSULE | Refills: 1 | Status: SHIPPED | OUTPATIENT
Start: 2021-03-19 | End: 2021-09-21

## 2021-03-19 NOTE — TELEPHONE ENCOUNTER
Routing refill request to provider for review/approval because:  Drug interaction warning    SHO CrowN, RN  [facility]       sob x couple of days pulse ox RA 96%

## 2021-04-13 DIAGNOSIS — E78.2 MIXED HYPERLIPIDEMIA: ICD-10-CM

## 2021-04-14 RX ORDER — GEMFIBROZIL 600 MG/1
600 TABLET, FILM COATED ORAL
Qty: 180 TABLET | Refills: 1 | Status: SHIPPED | OUTPATIENT
Start: 2021-04-14 | End: 2021-06-04

## 2021-04-14 NOTE — TELEPHONE ENCOUNTER
LOPID   Last Written Prescription Date:  9/28/20  Last Fill Quantity: 90,  # refills: 1   Last office visit: 8/3/20 Dr. Amin   Future Office Visit:  NONE  Routing refill request to provider for review/approval because:   High Drug interaction warning for use with Simvastatin.  GRISELDA Nava

## 2021-06-04 DIAGNOSIS — E78.2 MIXED HYPERLIPIDEMIA: ICD-10-CM

## 2021-06-04 RX ORDER — GEMFIBROZIL 600 MG/1
TABLET, FILM COATED ORAL
Qty: 180 TABLET | Refills: 1 | Status: SHIPPED | OUTPATIENT
Start: 2021-06-04 | End: 2021-09-21

## 2021-08-03 NOTE — PROGRESS NOTES
CARDIOLOGY CLINIC VISIT  DATE OF SERVICE:  August 3, 2021    PRIMARY CARE PHYSICIAN:  [facility]    HISTORY OF PRESENT ILLNESS:  Mr. Osei Jarrett is a pleasant 64-year-old male with past medical history significant for coronary artery disease, s/p multiple PCIs including PCI of the mid LAD (on 3/09/2001) with thrombectomy and PCI of the proximal LCX with kissing balloon to OM (on 6/03/2008) for acute inferior MI complicated by out of hospital cardiac arrest and most recent PCI with MARIANN x1 to the mid RCA (5/29/2019), hypertension, hyperlipidemia, diabetes mellitus (diet controlled), history of tobacco use (quit in 2008) . He was last seen by me virtually in 7/2020 and presents today for follow up.    Previously, he was switched from atorvastatin to rosuvastatin and Zetia and his LDL is now at goal.    In follow up today, Willie reports feeling well.  He is active at home and recently built two [employer] as well as a large garden for his wife.  He bikes and hikes.  He denies entirely any exertional chest pain, chest discomfort or shortness of breath.  He is taking his medications regularly.  He states he is having difficulty with knee pain, but is without any cardiac concerns.       PAST MEDICAL HISTORY:  1.  Coronary artery disease:  S/P multiple PCIs of mid LAD (2001), PCI of proximal LCx with kissing balloon to OM (2008) for acute inferior MI complicated by out of hospital cardiac arrest, PCI with MARIANN to RCA (5/2019).  Normal LV function by echocardiogram 5/2019 EF 60-65% with basal inferior wall hypokinesis  2.  Hypertension  3.  Hyperlipidemia  4.  Diabetes type II    MEDICATIONS:  Current Outpatient Medications   Medication     ASPIRIN 81 PO     Cholecalciferol (VITAMIN D3) 50 MCG (2000 UT) CAPS     ezetimibe (ZETIA) 10 MG tablet     Flaxseed, Linseed, (FLAX SEED OIL) 1000 MG capsule     gemfibrozil (LOPID) 600 MG tablet     metoprolol succinate ER (TOPROL-XL) 50 MG 24 hr tablet     multivitamin w/minerals  (MULTI-VITAMIN) tablet     nitroGLYcerin (NITROSTAT) 0.4 MG sublingual tablet     Omega-3 Fatty Acids (FISH OIL) 1200 MG capsule     ramipril (ALTACE) 5 MG capsule     rosuvastatin (CRESTOR) 40 MG tablet     No current facility-administered medications for this visit.       ALLERGIES:  No Known Allergies    SOCIAL HISTORY:  I have reviewed this patient's social history and updated it with pertinent information if needed. TAMMIE Jarrett  reports that he has quit smoking. He has never used smokeless tobacco. He reports current alcohol use. He reports that he does not use drugs.    FAMILY HISTORY:  I have reviewed this patient's family history and updated it with pertinent information if needed.   No family history on file.    REVIEW OF SYSTEMS:  A complete ROS was obtained and the pertinent positives are outlined in the history of present illness above. The remainder of systems is negative.      PHYSICAL EXAM:                     Vital Signs with Ranges     0 lbs 0 oz    Constitutional: awake, alert, no distress  Eyes: PERRL, sclera nonicteric  ENT: trachea midline  Respiratory: CTAB  Cardiovascular: RRR no m/r/g,no JVD  GI: nondistended, nontender, bowel sounds present  Lymph/Hematologic: no lymphadenopathy  Skin: dry, no rash  Musculoskeletal: good muscle tone, no edema bilaterally  Neurologic: no focal deficits  Neuropsychiatric: appropriate affact          ASSESSMENT:  1.  Coronary artery disease:  Multiple PCIs as outlined above.  Echocardiogram with normal LV function, basal inferior WMA by echo in 2019.  Currently stable without symptoms concerning for obstructive CAD  2.  Hypertension:  At goal  3.  Hyperlipidemia:  At goal on combination zetia and crestor.  He is also maintained on gemfibrozil.    4.  Impaired fasting glucose        RECOMMENDATIONS:  1. Fasting blood work today  2. Continue efforts in regular exercise and eating a heart healthy diet which was reviewed today.  Follow up in one year or before  than as necessary.      Julita Guzmán MD [facility]  Cardiology - [facility]  August 4, 2021

## 2021-08-04 ENCOUNTER — OFFICE VISIT (OUTPATIENT)
Dept: CARDIOLOGY | Facility: CLINIC | Age: 66
End: 2021-08-04
Payer: MEDICARE

## 2021-08-04 VITALS
WEIGHT: 248.1 LBS | BODY MASS INDEX: 33.61 KG/M2 | OXYGEN SATURATION: 98 % | SYSTOLIC BLOOD PRESSURE: 110 MMHG | HEART RATE: 60 BPM | HEIGHT: 72 IN | DIASTOLIC BLOOD PRESSURE: 74 MMHG

## 2021-08-04 DIAGNOSIS — R73.01 IMPAIRED FASTING GLUCOSE: ICD-10-CM

## 2021-08-04 DIAGNOSIS — E88.819 INSULIN RESISTANCE: Primary | ICD-10-CM

## 2021-08-04 DIAGNOSIS — E78.5 HYPERLIPIDEMIA LDL GOAL <70: ICD-10-CM

## 2021-08-04 DIAGNOSIS — I25.10 CORONARY ARTERY DISEASE WITHOUT ANGINA PECTORIS, UNSPECIFIED VESSEL OR LESION TYPE, UNSPECIFIED WHETHER NATIVE OR TRANSPLANTED HEART: ICD-10-CM

## 2021-08-04 LAB
ANION GAP SERPL CALCULATED.3IONS-SCNC: 3 MMOL/L (ref 3–14)
BUN SERPL-MCNC: 19 MG/DL (ref 7–30)
CALCIUM SERPL-MCNC: 9.3 MG/DL (ref 8.5–10.1)
CHLORIDE BLD-SCNC: 109 MMOL/L (ref 94–109)
CHOLEST SERPL-MCNC: 138 MG/DL
CO2 SERPL-SCNC: 28 MMOL/L (ref 20–32)
CREAT SERPL-MCNC: 1.09 MG/DL (ref 0.66–1.25)
FASTING STATUS PATIENT QL REPORTED: YES
GFR SERPL CREATININE-BSD FRML MDRD: 70 ML/MIN/1.73M2
GLUCOSE BLD-MCNC: 99 MG/DL (ref 70–99)
HBA1C MFR BLD: 5.9 % (ref 0–5.6)
HDLC SERPL-MCNC: 50 MG/DL
LDLC SERPL CALC-MCNC: 63 MG/DL
NONHDLC SERPL-MCNC: 88 MG/DL
POTASSIUM BLD-SCNC: 4.6 MMOL/L (ref 3.4–5.3)
SODIUM SERPL-SCNC: 140 MMOL/L (ref 133–144)
TRIGL SERPL-MCNC: 124 MG/DL

## 2021-08-04 PROCEDURE — 36415 COLL VENOUS BLD VENIPUNCTURE: CPT | Performed by: INTERNAL MEDICINE

## 2021-08-04 PROCEDURE — 80061 LIPID PANEL: CPT | Performed by: INTERNAL MEDICINE

## 2021-08-04 PROCEDURE — 99214 OFFICE O/P EST MOD 30 MIN: CPT | Performed by: INTERNAL MEDICINE

## 2021-08-04 PROCEDURE — 80048 BASIC METABOLIC PNL TOTAL CA: CPT | Performed by: INTERNAL MEDICINE

## 2021-08-04 PROCEDURE — 83036 HEMOGLOBIN GLYCOSYLATED A1C: CPT | Performed by: INTERNAL MEDICINE

## 2021-08-04 ASSESSMENT — MIFFLIN-ST. JEOR: SCORE: 1938.37

## 2021-08-04 NOTE — LETTER
8/4/2021    [facility]  [address] [facility] [address]    RE: TAMMIE Jarrett       Dear Colleague,    I had the pleasure of seeing TAMMIE Jarrett in the [facility].    CARDIOLOGY CLINIC VISIT  DATE OF SERVICE:  August 3, 2021    PRIMARY CARE PHYSICIAN:  [facility]    HISTORY OF PRESENT ILLNESS:  Mr. Tammie Jarrett is a pleasant 64-year-old male with past medical history significant for coronary artery disease, s/p multiple PCIs including PCI of the mid LAD (on 3/09/2001) with thrombectomy and PCI of the proximal LCX with kissing balloon to OM (on 6/03/2008) for acute inferior MI complicated by out of hospital cardiac arrest and most recent PCI with MARIANN x1 to the mid RCA (5/29/2019), hypertension, hyperlipidemia, diabetes mellitus (diet controlled), history of tobacco use (quit in 2008) . He was last seen by me virtually in 7/2020 and presents today for follow up.    Previously, he was switched from atorvastatin to rosuvastatin and Zetia and his LDL is now at goal.    In follow up today, Willie reports feeling well.  He is active at home and recently built two [employer] as well as a large garden for his wife.  He bikes and hikes.  He denies entirely any exertional chest pain, chest discomfort or shortness of breath.  He is taking his medications regularly.  He states he is having difficulty with knee pain, but is without any cardiac concerns.       PAST MEDICAL HISTORY:  1.  Coronary artery disease:  S/P multiple PCIs of mid LAD (2001), PCI of proximal LCx with kissing balloon to OM (2008) for acute inferior MI complicated by out of hospital cardiac arrest, PCI with MARIANN to RCA (5/2019).  Normal LV function by echocardiogram 5/2019 EF 60-65% with basal inferior wall hypokinesis  2.  Hypertension  3.  Hyperlipidemia  4.  Diabetes type II    MEDICATIONS:  Current Outpatient Medications   Medication     ASPIRIN 81 PO     Cholecalciferol (VITAMIN D3) 50 MCG  (2000 UT) CAPS     ezetimibe (ZETIA) 10 MG tablet     Flaxseed, Linseed, (FLAX SEED OIL) 1000 MG capsule     gemfibrozil (LOPID) 600 MG tablet     metoprolol succinate ER (TOPROL-XL) 50 MG 24 hr tablet     multivitamin w/minerals (MULTI-VITAMIN) tablet     nitroGLYcerin (NITROSTAT) 0.4 MG sublingual tablet     Omega-3 Fatty Acids (FISH OIL) 1200 MG capsule     ramipril (ALTACE) 5 MG capsule     rosuvastatin (CRESTOR) 40 MG tablet     No current facility-administered medications for this visit.       ALLERGIES:  No Known Allergies    SOCIAL HISTORY:  I have reviewed this patient's social history and updated it with pertinent information if needed. TAMMIE Jarrett  reports that he has quit smoking. He has never used smokeless tobacco. He reports current alcohol use. He reports that he does not use drugs.    FAMILY HISTORY:  I have reviewed this patient's family history and updated it with pertinent information if needed.   No family history on file.    REVIEW OF SYSTEMS:  A complete ROS was obtained and the pertinent positives are outlined in the history of present illness above. The remainder of systems is negative.      PHYSICAL EXAM:                     Vital Signs with Ranges     0 lbs 0 oz    Constitutional: awake, alert, no distress  Eyes: PERRL, sclera nonicteric  ENT: trachea midline  Respiratory: CTAB  Cardiovascular: RRR no m/r/g,no JVD  GI: nondistended, nontender, bowel sounds present  Lymph/Hematologic: no lymphadenopathy  Skin: dry, no rash  Musculoskeletal: good muscle tone, no edema bilaterally  Neurologic: no focal deficits  Neuropsychiatric: appropriate affact          ASSESSMENT:  1.  Coronary artery disease:  Multiple PCIs as outlined above.  Echocardiogram with normal LV function, basal inferior WMA by echo in 2019.  Currently stable without symptoms concerning for obstructive CAD  2.  Hypertension:  At goal  3.  Hyperlipidemia:  At goal on combination zetia and crestor.  He is also maintained on  gemfibrozil.    4.  Impaired fasting glucose        RECOMMENDATIONS:  1. Fasting blood work today  2. Continue efforts in regular exercise and eating a heart healthy diet which was reviewed today.  Follow up in one year or before than as necessary.      Julita Guzmán MD [facility]  Cardiology - [facility]  August 4, 2021      Thank you for allowing me to participate in the care of your patient.      Sincerely,     Julita Guzmán MD     [facility]  cc:   No referring provider defined for this encounter.

## 2021-08-04 NOTE — PATIENT INSTRUCTIONS
-Continue current medications; no changes made today  -Lab work today   -Follow up in 12 months with Dr. Guzmán

## 2021-08-16 DIAGNOSIS — E78.2 MIXED HYPERLIPIDEMIA: ICD-10-CM

## 2021-08-16 DIAGNOSIS — I10 BENIGN ESSENTIAL HYPERTENSION: ICD-10-CM

## 2021-08-17 ENCOUNTER — OFFICE VISIT (OUTPATIENT)
Dept: INTERNAL MEDICINE | Facility: CLINIC | Age: 66
End: 2021-08-17
Payer: MEDICARE

## 2021-08-17 VITALS
OXYGEN SATURATION: 96 % | DIASTOLIC BLOOD PRESSURE: 80 MMHG | RESPIRATION RATE: 18 BRPM | WEIGHT: 251 LBS | TEMPERATURE: 97.5 F | BODY MASS INDEX: 34.04 KG/M2 | SYSTOLIC BLOOD PRESSURE: 108 MMHG | HEART RATE: 64 BPM

## 2021-08-17 DIAGNOSIS — Z12.5 SCREENING FOR PROSTATE CANCER: ICD-10-CM

## 2021-08-17 DIAGNOSIS — Z13.6 SCREENING FOR AAA (ABDOMINAL AORTIC ANEURYSM): Primary | ICD-10-CM

## 2021-08-17 LAB — PSA SERPL-MCNC: 0.27 UG/L (ref 0–4)

## 2021-08-17 PROCEDURE — G0439 PPPS, SUBSEQ VISIT: HCPCS | Performed by: INTERNAL MEDICINE

## 2021-08-17 PROCEDURE — 36415 COLL VENOUS BLD VENIPUNCTURE: CPT | Performed by: INTERNAL MEDICINE

## 2021-08-17 PROCEDURE — G0103 PSA SCREENING: HCPCS | Performed by: INTERNAL MEDICINE

## 2021-08-17 ASSESSMENT — ENCOUNTER SYMPTOMS
PALPITATIONS: 0
HEARTBURN: 0
DIARRHEA: 0
DYSURIA: 0
ARTHRALGIAS: 1
JOINT SWELLING: 0
CHILLS: 0
DIZZINESS: 0
SHORTNESS OF BREATH: 0
EYE PAIN: 0
PARESTHESIAS: 0
HEMATOCHEZIA: 0
ABDOMINAL PAIN: 0
FREQUENCY: 0
HEADACHES: 0
HEMATURIA: 0
MYALGIAS: 0
FEVER: 0
COUGH: 0
WEAKNESS: 0
NERVOUS/ANXIOUS: 0
SORE THROAT: 0
NAUSEA: 0
CONSTIPATION: 0

## 2021-08-17 ASSESSMENT — PAIN SCALES - GENERAL: PAINLEVEL: MILD PAIN (2)

## 2021-08-17 ASSESSMENT — ACTIVITIES OF DAILY LIVING (ADL): CURRENT_FUNCTION: NO ASSISTANCE NEEDED

## 2021-08-17 NOTE — PROGRESS NOTES
"SUBJECTIVE:   TAMMIE Jarrett is a 65 year old male who presents for Preventive Visit.    Patient has been advised of split billing requirements and indicates understanding: Yes   Are you in the first 12 months of your Medicare coverage?  No    Healthy Habits:     In general, how would you rate your overall health?  Good    Frequency of exercise:  1 day/week    Duration of exercise:  15-30 minutes    Do you usually eat at least 4 servings of fruit and vegetables a day, include whole grains    & fiber and avoid regularly eating high fat or \"junk\" foods?  No    Taking medications regularly:  Yes    Medication side effects:  None    Ability to successfully perform activities of daily living:  No assistance needed    Home Safety:  No safety concerns identified    Hearing Impairment:  No hearing concerns    In the past 6 months, have you been bothered by leaking of urine?  No    In general, how would you rate your overall mental or emotional health?  Good      PHQ-2 Total Score: 0    Additional concerns today:  No    Do you feel safe in your environment? Yes    Have you ever done Advance Care Planning? (For example, a Health Directive, POLST, or a discussion with a medical provider or your loved ones about your wishes): No, advance care planning information given to patient to review.  Patient plans to discuss their wishes with loved ones or provider.         Fall risk  Fallen 2 or more times in the past year?: No  Any fall with injury in the past year?: No  click delete button to remove this line now  Cognitive Screening   1) Repeat 3 items (Leader, Season, Table)    2) Clock draw: NORMAL  3) 3 item recall: Recalls 3 objects  Results: 3 items recalled: COGNITIVE IMPAIRMENT LESS LIKELY    Mini-CogTM Copyright MARSHA Major. Licensed by the author for use in [facility]; reprinted with permission (ebony@.[email or web address]). All rights reserved.        Reviewed and updated as needed this visit by clinical staff  Tobacco  " Allergies  Meds   Med Hx  Surg Hx  Fam Hx  Soc Hx        Reviewed and updated as needed this visit by Provider                Social History     Tobacco Use     Smoking status: Former Smoker     Packs/day: 0.00     Years: 0.00     Pack years: 0.00     Smokeless tobacco: Never Used   Substance Use Topics     Alcohol use: Yes     If you drink alcohol do you typically have >3 drinks per day or >7 drinks per week? No    Alcohol Use 8/17/2021   Prescreen: >3 drinks/day or >7 drinks/week? No   No flowsheet data found.        -------------------------------------    Current providers sharing in care for this patient include:   Patient Care Team:  Devon Amin DO as PCP - General (Internal Medicine)  Devon Amin DO as Assigned PCP  Julita Guzmán MD as Assigned Heart and Vascular Provider    The following health maintenance items are reviewed in Epic and correct as of today:  Health Maintenance Due   Topic Date Due     ANNUAL REVIEW OF HM ORDERS  Never done     HEPATITIS C SCREENING  Never done     ZOSTER IMMUNIZATION (1 of 2) Never done     Pneumococcal Vaccine: 65+ Years (1 of 1 - PPSV23) Never done     AORTIC ANEURYSM SCREENING (SYSTEM ASSIGNED)  Never done     FALL RISK ASSESSMENT  08/03/2021     Lab work is in process  Labs reviewed in EPIC  BP Readings from Last 3 Encounters:   08/17/21 108/80   08/04/21 110/74   08/20/20 111/84    Wt Readings from Last 3 Encounters:   08/17/21 113.9 kg (251 lb)   08/04/21 112.5 kg (248 lb 1.6 oz)   08/03/20 108.4 kg (239 lb)                  Patient Active Problem List   Diagnosis     Coronary atherosclerosis     Encounter for colonoscopy in patient with family history of colon cancer     Mixed hyperlipidemia     Postsurgical percutaneous transluminal coronary angioplasty status     Hyperlipidemia     Past Surgical History:   Procedure Laterality Date     COLONOSCOPY N/A 8/20/2020    Procedure: Colonoscopy;  Surgeon: Ean Gerard MD;   Location:  GI       Social History     Tobacco Use     Smoking status: Former Smoker     Packs/day: 0.00     Years: 0.00     Pack years: 0.00     Smokeless tobacco: Never Used   Substance Use Topics     Alcohol use: Yes     History reviewed. No pertinent family history.      Current Outpatient Medications   Medication Sig Dispense Refill     ASPIRIN 81 PO        Cholecalciferol (VITAMIN D3) 50 MCG (2000 UT) CAPS Take 2,000 Units by mouth daily       ezetimibe (ZETIA) 10 MG tablet Take 1 tablet (10 mg) by mouth daily 90 tablet 1     Flaxseed, Linseed, (FLAX SEED OIL) 1000 MG capsule Take 1 capsule by mouth daily Taking 1400mg twice daily       gemfibrozil (LOPID) 600 MG tablet TAKE 1 TABLET TWICE DAILY BEFORE MEALS 180 tablet 1     metoprolol succinate ER (TOPROL-XL) 50 MG 24 hr tablet Take 1 tablet (50 mg) by mouth daily 90 tablet 1     multivitamin w/minerals (MULTI-VITAMIN) tablet Take by mouth daily       Omega-3 Fatty Acids (FISH OIL) 1200 MG capsule Take 1,200 mg by mouth 3 times daily       ramipril (ALTACE) 5 MG capsule Take 1 capsule (5 mg) by mouth daily 90 capsule 1     rosuvastatin (CRESTOR) 40 MG tablet TAKE 1 TABLET BY MOUTH EVERYDAY AT BEDTIME 90 tablet 1     nitroGLYcerin (NITROSTAT) 0.4 MG sublingual tablet Place 0.4 mg under the tongue every 5 minutes as needed for chest pain For chest pain place 1 tablet under the tongue every 5 minutes for 3 doses. If symptoms persist 5 minutes after 1st dose call 911. (Patient not taking: Reported on 8/4/2021)       No Known Allergies          Review of Systems   Constitutional: Negative for chills and fever.   HENT: Negative for congestion, ear pain, hearing loss and sore throat.    Eyes: Negative for pain and visual disturbance.   Respiratory: Negative for cough and shortness of breath.    Cardiovascular: Negative for chest pain, palpitations and peripheral edema.   Gastrointestinal: Negative for abdominal pain, constipation, diarrhea, heartburn, hematochezia  and nausea.   Genitourinary: Negative for discharge, dysuria, frequency, genital sores, hematuria, impotence and urgency.   Musculoskeletal: Positive for arthralgias. Negative for joint swelling and myalgias.   Skin: Negative for rash.   Neurological: Negative for dizziness, weakness, headaches and paresthesias.   Psychiatric/Behavioral: Negative for mood changes. The patient is not nervous/anxious.          OBJECTIVE:   /80 (BP Location: Right arm, Patient Position: Sitting, Cuff Size: Adult Regular)   Pulse 64   Temp 97.5  F (36.4  C) (Temporal)   Resp 18   Wt 113.9 kg (251 lb)   SpO2 96%   BMI 34.04 kg/m   Estimated body mass index is 34.04 kg/m  as calculated from the following:    Height as of 8/4/21: 1.829 m (6').    Weight as of this encounter: 113.9 kg (251 lb).  Physical Exam  GENERAL: healthy, alert and no distress  EYES: Eyes grossly normal to inspection, PERRL and conjunctivae and sclerae normal  HENT: ear canals and TM's normal, nose and mouth without ulcers or lesions  NECK: no adenopathy, no asymmetry, masses, or scars and thyroid normal to palpation  RESP: lungs clear to auscultation - no rales, rhonchi or wheezes  CV: regular rate and rhythm, normal S1 S2, no S3 or S4, no murmur, click or rub, no peripheral edema and peripheral pulses strong  ABDOMEN: soft, nontender, no hepatosplenomegaly, no masses and bowel sounds normal  MS: no gross musculoskeletal defects noted, no edema  SKIN: no suspicious lesions or rashes  NEURO: Normal strength and tone, mentation intact and speech normal  PSYCH: mentation appears normal, affect normal/bright    Diagnostic Test Results:  Labs reviewed in Epic  No results found for this or any previous visit (from the past 24 hour(s)).    ASSESSMENT / PLAN:       ICD-10-CM    1. Screening for AAA (abdominal aortic aneurysm)  Z13.6  Aorta Medicare AAA Screening   2. Screening for prostate cancer  Z12.5 PSA, screen       Patient has been advised of split  billing requirements and indicates understanding: Yes  COUNSELING:  Reviewed preventive health counseling, as reflected in patient instructions       Consider AAA screening for ages 65-75 and smoking history       Regular exercise       Healthy diet/nutrition       Colon cancer screening       Prostate cancer screening    Estimated body mass index is 34.04 kg/m  as calculated from the following:    Height as of 8/4/21: 1.829 m (6').    Weight as of this encounter: 113.9 kg (251 lb).    Weight management plan: Discussed healthy diet and exercise guidelines    He reports that he has quit smoking. He smoked 0.00 packs per day for 0.00 years. He has never used smokeless tobacco.      Appropriate preventive services were discussed with this patient, including applicable screening as appropriate for cardiovascular disease, diabetes, osteopenia/osteoporosis, and glaucoma.  As appropriate for age/gender, discussed screening for colorectal cancer, prostate cancer, breast cancer, and cervical cancer. Checklist reviewing preventive services available has been given to the patient.    Reviewed patients plan of care and provided an AVS. The Basic Care Plan (routine screening as documented in Health Maintenance) for TAMMIE meets the Care Plan requirement. This Care Plan has been established and reviewed with the Patient.    Counseling Resources:  ATP IV Guidelines  Pooled Cohorts Equation Calculator  Breast Cancer Risk Calculator  Breast Cancer: Medication to Reduce Risk  FRAX Risk Assessment  ICSI Preventive Guidelines  Dietary Guidelines for Americans, 2010  USDA's MyPlate  ASA Prophylaxis  Lung CA Screening    Devon Amin DO  [facility]    Identified Health Risks:

## 2021-08-18 DIAGNOSIS — E78.2 MIXED HYPERLIPIDEMIA: ICD-10-CM

## 2021-08-18 RX ORDER — ROSUVASTATIN CALCIUM 40 MG/1
TABLET, COATED ORAL
Qty: 90 TABLET | Refills: 0 | Status: SHIPPED | OUTPATIENT
Start: 2021-08-18 | End: 2021-09-24

## 2021-08-18 RX ORDER — EZETIMIBE 10 MG/1
10 TABLET ORAL DAILY
Qty: 90 TABLET | Refills: 0 | Status: SHIPPED | OUTPATIENT
Start: 2021-08-18 | End: 2021-09-24

## 2021-08-18 RX ORDER — METOPROLOL SUCCINATE 50 MG/1
TABLET, EXTENDED RELEASE ORAL
Qty: 90 TABLET | Refills: 0 | Status: SHIPPED | OUTPATIENT
Start: 2021-08-18 | End: 2021-09-24

## 2021-08-18 NOTE — TELEPHONE ENCOUNTER
"Requested Prescriptions   Pending Prescriptions Disp Refills     metoprolol succinate ER (TOPROL-XL) 50 MG 24 hr tablet [Pharmacy Med Name: METOPROLOL SUCCINATE ER 50 MG Tablet Extended Release 24 Hour] 90 tablet 1     Sig: TAKE 1 TABLET EVERY DAY   8/17/2021    Beta-Blockers Protocol Passed - 8/16/2021  5:02 AM        Passed - Blood pressure under 140/90 in past 12 months     BP Readings from Last 3 Encounters:   08/17/21 108/80   08/04/21 110/74   08/20/20 111/84             Passed - Patient is age 6 or older        Passed - Recent (12 mo) or future (30 days) visit within the authorizing provider's specialty     Patient has had an office visit with the authorizing provider or a provider within the authorizing providers department within the previous 12 mos or has a future within next 30 days. See \"Patient Info\" tab in inbasket, or \"Choose Columns\" in Meds & Orders section of the refill encounter.              Passed - Medication is active on med list             rosuvastatin (CRESTOR) 40 MG tablet [Pharmacy Med Name: ROSUVASTATIN CALCIUM 40 MG Tablet] 90 tablet 1     Sig: TAKE 1 TABLET EVERY DAY AT BEDTIME       Statins Protocol Passed - 8/16/2021  5:02 AM        Passed - LDL on file in past 12 months     Recent Labs   Lab Test 08/04/21  1129   LDL 63             Passed - No abnormal creatine kinase in past 12 months     No lab results found.             Passed - Recent (12 mo) or future (30 days) visit within the authorizing provider's specialty     Patient has had an office visit with the authorizing provider or a provider within the authorizing providers department within the previous 12 mos or has a future within next 30 days. See \"Patient Info\" tab in inbasket, or \"Choose Columns\" in Meds & Orders section of the refill encounter.              Passed - Medication is active on med list        Passed - Patient is age 18 or older             ezetimibe (ZETIA) 10 MG tablet [Pharmacy Med Name: EZETIMIBE 10 MG " "Tablet] 90 tablet 1     Sig: TAKE 1 TABLET (10 MG) BY MOUTH DAILY       Antihyperlipidemic agents Failed - 8/16/2021  5:02 AM        Failed - Lipid panel on file in past 12 mos     Recent Labs   Lab Test 08/04/21  1129   CHOL 138   TRIG 124   HDL 50   LDL 63   NHDL 88               Failed - Normal serum ALT on record in past 12 mos     Recent Labs   Lab Test 08/03/20  0848   ALT 49             Passed - Recent (12 mo) or future (30 days) visit within the authorizing provider's specialty     Patient has had an office visit with the authorizing provider or a provider within the authorizing providers department within the previous 12 mos or has a future within next 30 days. See \"Patient Info\" tab in inbasket, or \"Choose Columns\" in Meds & Orders section of the refill encounter.              Passed - Medication is active on med list        Passed - Patient is age 18 years or older         Routing refill requests to provider for review/approval  Sarah Edward RN      "

## 2021-08-18 NOTE — TELEPHONE ENCOUNTER
ezetimibe (ZETIA) 10 MG tablet [Pharmacy Med Name: EZETIMIBE 10 MG Tablet] 90 tablet 1    Sig: TAKE 1 TABLET (10 MG) BY MOUTH DAILY       rosuvastatin (CRESTOR) 40 MG tablet [Pharmacy Med Name: ROSUVASTATIN CALCIUM 40 MG Tablet] 90 tablet 1    Sig: TAKE 1 TABLET EVERY DAY AT BEDTIME       metoprolol succinate ER (TOPROL-XL) 50 MG 24 hr tablet [Pharmacy Med Name: METOPROLOL SUCCINATE ER 50 MG Tablet Extended Release 24 Hour] 90 tablet 1    Sig: TAKE 1 TABLET EVERY DAY       Routing refill request to provider for review/approval because:  Drug interaction warning- VERY high    All prescriptions T'd up 3 month for provider review.    Sarah Edward, GRISELDA

## 2021-08-20 ENCOUNTER — HOSPITAL ENCOUNTER (OUTPATIENT)
Dept: ULTRASOUND IMAGING | Facility: CLINIC | Age: 66
Discharge: HOME | End: 2021-08-20
Attending: INTERNAL MEDICINE | Admitting: INTERNAL MEDICINE
Payer: MEDICARE

## 2021-08-20 DIAGNOSIS — Z13.6 SCREENING FOR AAA (ABDOMINAL AORTIC ANEURYSM): ICD-10-CM

## 2021-08-20 PROCEDURE — 76706 US ABDL AORTA SCREEN AAA: CPT

## 2021-08-20 RX ORDER — GEMFIBROZIL 600 MG/1
TABLET, FILM COATED ORAL
Qty: 180 TABLET | Refills: 1 | OUTPATIENT
Start: 2021-08-20

## 2021-08-20 NOTE — TELEPHONE ENCOUNTER
"Denied  Refills are current, sent to this pharmacy 6/4/2021 for 180 tablets and 1 refill  Requested Prescriptions   Pending Prescriptions Disp Refills     gemfibrozil (LOPID) 600 MG tablet [Pharmacy Med Name: GEMFIBROZIL 600 MG Tablet] 180 tablet 1     Sig: TAKE 1 TABLET TWICE DAILY BEFORE MEALS     Last office visit: 8/17/2021 with prescribing provider:     Future Office Visit:        Fibrates Failed - 8/18/2021  6:23 AM        Failed - Lipid panel on file in past 12 months     Recent Labs   Lab Test 08/04/21  1129   CHOL 138   TRIG 124   HDL 50   LDL 63   NHDL 88               Passed - No abnormal creatine kinase in past 12 months     No lab results found.             Passed - Recent (12 mo) or future (30 days) visit within the authorizing provider's specialty     Patient has had an office visit with the authorizing provider or a provider within the authorizing providers department within the previous 12 mos or has a future within next 30 days. See \"Patient Info\" tab in inbasket, or \"Choose Columns\" in Meds & Orders section of the refill encounter.              Passed - Medication is active on med list        Passed - Patient is age 18 or older         Sarah Edward RN      "

## 2021-08-27 ENCOUNTER — TELEPHONE (OUTPATIENT)
Dept: INTERNAL MEDICINE | Facility: CLINIC | Age: 66
End: 2021-08-27

## 2021-08-27 NOTE — TELEPHONE ENCOUNTER
Patient calling to follow up on results of his AAA screening as he has not heard anything. Please advise on his results in the absence of his provider. Lakisha Haddad LPN

## 2021-08-30 NOTE — TELEPHONE ENCOUNTER
Please call patient. 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 Recommendation is follow up US in 3 years. This will be reviewed by Dr. Amin at next follow up visit.  Bryson Sinha MD

## 2021-09-21 DIAGNOSIS — I10 BENIGN ESSENTIAL HYPERTENSION: ICD-10-CM

## 2021-09-21 DIAGNOSIS — E78.2 MIXED HYPERLIPIDEMIA: ICD-10-CM

## 2021-09-24 RX ORDER — METOPROLOL SUCCINATE 50 MG/1
TABLET, EXTENDED RELEASE ORAL
Qty: 90 TABLET | Refills: 3 | OUTPATIENT
Start: 2021-09-24

## 2021-09-24 RX ORDER — EZETIMIBE 10 MG/1
10 TABLET ORAL DAILY
Qty: 90 TABLET | Refills: 3 | Status: SHIPPED | OUTPATIENT
Start: 2021-09-24 | End: 2022-07-11

## 2021-09-24 RX ORDER — ROSUVASTATIN CALCIUM 40 MG/1
TABLET, COATED ORAL
Qty: 90 TABLET | Refills: 3 | Status: SHIPPED | OUTPATIENT
Start: 2021-09-24 | End: 2022-07-11

## 2021-09-24 RX ORDER — ROSUVASTATIN CALCIUM 40 MG/1
TABLET, COATED ORAL
Qty: 90 TABLET | Refills: 3 | OUTPATIENT
Start: 2021-09-24

## 2021-09-24 RX ORDER — METOPROLOL SUCCINATE 50 MG/1
TABLET, EXTENDED RELEASE ORAL
Qty: 90 TABLET | Refills: 3 | Status: SHIPPED | OUTPATIENT
Start: 2021-09-24 | End: 2022-07-11

## 2021-09-24 RX ORDER — RAMIPRIL 5 MG/1
CAPSULE ORAL
Qty: 90 CAPSULE | Refills: 2 | Status: SHIPPED | OUTPATIENT
Start: 2021-09-24 | End: 2022-05-16

## 2021-09-24 RX ORDER — GEMFIBROZIL 600 MG/1
TABLET, FILM COATED ORAL
Qty: 180 TABLET | Refills: 2 | Status: SHIPPED | OUTPATIENT
Start: 2021-09-24 | End: 2022-04-28

## 2021-09-24 RX ORDER — EZETIMIBE 10 MG/1
TABLET ORAL
Qty: 90 TABLET | Refills: 3 | OUTPATIENT
Start: 2021-09-24

## 2021-09-24 NOTE — TELEPHONE ENCOUNTER
"Requested Prescriptions   Pending Prescriptions Disp Refills     gemfibrozil (LOPID) 600 MG tablet [Pharmacy Med Name: GEMFIBROZIL 600 MG Tablet] 180 tablet 3     Sig: TAKE 1 TABLET TWICE DAILY BEFORE MEALS       Fibrates Passed - 9/21/2021  1:46 PM        Passed - Lipid panel on file in past 12 months     Recent Labs   Lab Test 08/04/21  1129   CHOL 138   TRIG 124   HDL 50   LDL 63   NHDL 88             Passed - No abnormal creatine kinase in past 12 months     No lab results found.             Passed - Recent (12 mo) or future (30 days) visit within the authorizing provider's specialty     Patient has had an office visit with the authorizing provider or a provider within the authorizing providers department within the previous 12 mos or has a future within next 30 days. See \"Patient Info\" tab in inbasket, or \"Choose Columns\" in Meds & Orders section of the refill encounter.              Passed - Medication is active on med list        Passed - Patient is age 18 or older           ramipril (ALTACE) 5 MG capsule [Pharmacy Med Name: RAMIPRIL 5 MG Capsule] 90 capsule 3     Sig: TAKE 1 CAPSULE EVERY DAY       ACE Inhibitors (Including Combos) Protocol Passed - 9/21/2021  1:46 PM        Passed - Blood pressure under 140/90 in past 12 months     BP Readings from Last 3 Encounters:   08/17/21 108/80   08/04/21 110/74   08/20/20 111/84                 Passed - Recent (12 mo) or future (30 days) visit within the authorizing provider's specialty     Patient has had an office visit with the authorizing provider or a provider within the authorizing providers department within the previous 12 mos or has a future within next 30 days. See \"Patient Info\" tab in inbasket, or \"Choose Columns\" in Meds & Orders section of the refill encounter.              Passed - Medication is active on med list        Passed - Patient is age 18 or older        Passed - Normal serum creatinine on file in past 12 months     Recent Labs   Lab Test " "08/04/21  1129   CR 1.09       Ok to refill medication if creatinine is low          Passed - Normal serum potassium on file in past 12 months     Recent Labs   Lab Test 08/04/21  1129   POTASSIUM 4.6                rosuvastatin (CRESTOR) 40 MG tablet [Pharmacy Med Name: ROSUVASTATIN CALCIUM 40 MG Tablet] 90 tablet 3     Sig: TAKE 1 TABLET AT BEDTIME       Statins Protocol Passed - 9/21/2021  1:46 PM        Passed - LDL on file in past 12 months     Recent Labs   Lab Test 08/04/21  1129   LDL 63             Passed - No abnormal creatine kinase in past 12 months     No lab results found.             Passed - Recent (12 mo) or future (30 days) visit within the authorizing provider's specialty     Patient has had an office visit with the authorizing provider or a provider within the authorizing providers department within the previous 12 mos or has a future within next 30 days. See \"Patient Info\" tab in inBuy buy teasket, or \"Choose Columns\" in Meds & Orders section of the refill encounter.              Passed - Medication is active on med list        Passed - Patient is age 18 or older           ezetimibe (ZETIA) 10 MG tablet [Pharmacy Med Name: EZETIMIBE 10 MG Tablet] 90 tablet 3     Sig: TAKE 1 TABLET EVERY DAY       Antihyperlipidemic agents Failed - 9/21/2021  1:46 PM        Failed - Normal serum ALT on record in past 12 mos     Recent Labs   Lab Test 08/03/20  0848   ALT 49             Passed - Lipid panel on file in past 12 mos     Recent Labs   Lab Test 08/04/21  1129   CHOL 138   TRIG 124   HDL 50   LDL 63   NHDL 88               Passed - Recent (12 mo) or future (30 days) visit within the authorizing provider's specialty     Patient has had an office visit with the authorizing provider or a provider within the authorizing providers department within the previous 12 mos or has a future within next 30 days. See \"Patient Info\" tab in inbasket, or \"Choose Columns\" in Meds & Orders section of the refill encounter.          " "    Passed - Medication is active on med list        Passed - Patient is age 18 years or older           metoprolol succinate ER (TOPROL-XL) 50 MG 24 hr tablet [Pharmacy Med Name: METOPROLOL SUCCINATE ER 50 MG Tablet Extended Release 24 Hour] 90 tablet 3     Sig: TAKE 1 TABLET EVERY DAY       Beta-Blockers Protocol Passed - 9/21/2021  1:46 PM        Passed - Blood pressure under 140/90 in past 12 months     BP Readings from Last 3 Encounters:   08/17/21 108/80   08/04/21 110/74   08/20/20 111/84                 Passed - Patient is age 6 or older        Passed - Recent (12 mo) or future (30 days) visit within the authorizing provider's specialty     Patient has had an office visit with the authorizing provider or a provider within the authorizing providers department within the previous 12 mos or has a future within next 30 days. See \"Patient Info\" tab in inbasket, or \"Choose Columns\" in Meds & Orders section of the refill encounter.              Passed - Medication is active on med list           Sarah Edward RN    "

## 2021-09-24 NOTE — TELEPHONE ENCOUNTER
gemfibrozil (LOPID) 600 MG tablet [Pharmacy Med Name: GEMFIBROZIL 600 MG Tablet] 180 tablet 3    Sig: TAKE 1 TABLET TWICE DAILY BEFORE MEALS   Routing refill request to provider for review/approval because:  Drug interaction warning    Sarah Edward RN

## 2021-09-25 ENCOUNTER — HEALTH MAINTENANCE LETTER (OUTPATIENT)
Age: 66
End: 2021-09-25

## 2022-04-27 DIAGNOSIS — E78.2 MIXED HYPERLIPIDEMIA: ICD-10-CM

## 2022-04-28 RX ORDER — GEMFIBROZIL 600 MG/1
TABLET, FILM COATED ORAL
Qty: 180 TABLET | Refills: 0 | Status: SHIPPED | OUTPATIENT
Start: 2022-04-28 | End: 2022-09-27

## 2022-04-28 NOTE — TELEPHONE ENCOUNTER
Pending Prescriptions:                       Disp   Refills    gemfibrozil (LOPID) 600 MG tablet [Pharmac*180 ta*0        Sig: TAKE 1 TABLET TWICE DAILY BEFORE MEALS      Routing refill request to provider for review/approval because:  Drug interaction warning    Ching Reddy RN

## 2022-05-14 DIAGNOSIS — I10 BENIGN ESSENTIAL HYPERTENSION: ICD-10-CM

## 2022-05-16 RX ORDER — RAMIPRIL 5 MG/1
CAPSULE ORAL
Qty: 90 CAPSULE | Refills: 2 | Status: SHIPPED | OUTPATIENT
Start: 2022-05-16 | End: 2023-10-16

## 2022-07-11 DIAGNOSIS — E78.2 MIXED HYPERLIPIDEMIA: ICD-10-CM

## 2022-07-11 DIAGNOSIS — I10 BENIGN ESSENTIAL HYPERTENSION: ICD-10-CM

## 2022-07-11 RX ORDER — EZETIMIBE 10 MG/1
TABLET ORAL
Qty: 90 TABLET | Refills: 3 | OUTPATIENT
Start: 2022-07-11

## 2022-07-11 RX ORDER — METOPROLOL SUCCINATE 50 MG/1
TABLET, EXTENDED RELEASE ORAL
Qty: 90 TABLET | Refills: 3 | OUTPATIENT
Start: 2022-07-11

## 2022-07-11 RX ORDER — ROSUVASTATIN CALCIUM 40 MG/1
TABLET, COATED ORAL
Qty: 90 TABLET | Refills: 3 | OUTPATIENT
Start: 2022-07-11

## 2022-08-03 ENCOUNTER — OFFICE VISIT (OUTPATIENT)
Dept: CARDIOLOGY | Facility: CLINIC | Age: 67
End: 2022-08-03
Attending: INTERNAL MEDICINE
Payer: MEDICARE

## 2022-08-03 VITALS
HEART RATE: 60 BPM | HEIGHT: 72 IN | SYSTOLIC BLOOD PRESSURE: 124 MMHG | WEIGHT: 255.3 LBS | BODY MASS INDEX: 34.58 KG/M2 | DIASTOLIC BLOOD PRESSURE: 86 MMHG | OXYGEN SATURATION: 99 %

## 2022-08-03 DIAGNOSIS — E78.5 HYPERLIPIDEMIA LDL GOAL <70: ICD-10-CM

## 2022-08-03 DIAGNOSIS — Z12.5 SCREENING FOR PROSTATE CANCER: ICD-10-CM

## 2022-08-03 DIAGNOSIS — R73.01 IMPAIRED FASTING GLUCOSE: ICD-10-CM

## 2022-08-03 DIAGNOSIS — I25.10 CORONARY ARTERY DISEASE WITHOUT ANGINA PECTORIS, UNSPECIFIED VESSEL OR LESION TYPE, UNSPECIFIED WHETHER NATIVE OR TRANSPLANTED HEART: Primary | ICD-10-CM

## 2022-08-03 PROCEDURE — 99214 OFFICE O/P EST MOD 30 MIN: CPT | Performed by: INTERNAL MEDICINE

## 2022-08-03 RX ORDER — EZETIMIBE 10 MG/1
10 TABLET ORAL DAILY
COMMUNITY
End: 2023-01-11

## 2022-08-03 RX ORDER — ROSUVASTATIN CALCIUM 40 MG/1
40 TABLET, COATED ORAL DAILY
COMMUNITY
End: 2022-11-25

## 2022-08-03 RX ORDER — METOPROLOL SUCCINATE 50 MG/1
50 TABLET, EXTENDED RELEASE ORAL DAILY
COMMUNITY
End: 2023-02-03

## 2022-08-03 RX ORDER — NITROGLYCERIN 0.4 MG/1
0.4 TABLET SUBLINGUAL EVERY 5 MIN PRN
Qty: 25 TABLET | Refills: 3 | Status: SHIPPED | OUTPATIENT
Start: 2022-08-03 | End: 2023-09-01

## 2022-08-03 NOTE — LETTER
8/3/2022    Devon Amin, DO  [address] [place] [address]    RE: Osei Jarrett       Dear Colleague,     I had the pleasure of seeing Osei Jarrett in the [facility].  CARDIOLOGY CLINIC VISIT  DATE OF SERVICE:  August 3, 2022    PRIMARY CARE PHYSICIAN:  Devon Amin    HISTORY OF PRESENT ILLNESS:  Mr. Osei Jarrett is a pleasant 66-year-old male with past medical history significant for coronary artery disease, s/p multiple PCIs including PCI of the mid LAD (on 3/09/2001) with thrombectomy and PCI of the proximal LCX with kissing balloon to OM (on 6/03/2008) for acute inferior MI complicated by out of hospital cardiac arrest and most recent PCI with MARIANN x1 to the mid RCA (5/29/2019), hypertension, hyperlipidemia, diabetes mellitus (diet controlled), history of tobacco use (quit in 2008) . He was last seen by me virtually in August 2021 and presents today for follow up.    Previously, he was switched from atorvastatin to rosuvastatin and Zetia and his LDL is now at goal.    In follow up today, Willie continues to feel well.  He is enjoying his new home on 2 acres with a pool.  He bought snow mobiles.  He remains active.  He denies any exertional chest pain, chest discomfort or shortness of breath.       PAST MEDICAL HISTORY:  1.  Coronary artery disease:  S/P multiple PCIs of mid LAD (2001), PCI of proximal LCx with kissing balloon to OM (2008) for acute inferior MI complicated by out of hospital cardiac arrest, PCI with MARIANN to RCA (5/2019).  Normal LV function by echocardiogram 5/2019 EF 60-65% with basal inferior wall hypokinesis  2.  Hypertension  3.  Hyperlipidemia  4.  Diabetes type II    MEDICATIONS:  Current Outpatient Medications   Medication     ASPIRIN 81 PO     Cholecalciferol (VITAMIN D3) 50 MCG (2000 UT) CAPS     Flaxseed, Linseed, (FLAX SEED OIL) 1000 MG capsule     gemfibrozil (LOPID) 600 MG tablet     multivitamin w/minerals (MULTI-VITAMIN) tablet      nitroGLYcerin (NITROSTAT) 0.4 MG sublingual tablet     Omega-3 Fatty Acids (FISH OIL) 1200 MG capsule     ramipril (ALTACE) 5 MG capsule     No current facility-administered medications for this visit.       ALLERGIES:  No Known Allergies    SOCIAL HISTORY:  I have reviewed this patient's social history and updated it with pertinent information if needed. TAMMIE Jarrett  reports that he has quit smoking. He smoked 0.00 packs per day for 0.00 years. He has never used smokeless tobacco. He reports current alcohol use. He reports that he does not use drugs.    FAMILY HISTORY:  I have reviewed this patient's family history and updated it with pertinent information if needed.   No family history on file.    REVIEW OF SYSTEMS:  A complete ROS was obtained and the pertinent positives are outlined in the history of present illness above. The remainder of systems is negative.      PHYSICAL EXAM:                     Vital Signs with Ranges     0 lbs 0 oz    Constitutional: awake, alert, no distress  Eyes: PERRL, sclera nonicteric  ENT: trachea midline  Respiratory: CTAB  Cardiovascular: RRR no m/r/g,no JVD  GI: nondistended, nontender, bowel sounds present  Lymph/Hematologic: no lymphadenopathy  Skin: dry, no rash  Musculoskeletal: good muscle tone, no edema bilaterally  Neurologic: no focal deficits  Neuropsychiatric: appropriate affact          ASSESSMENT:  1.  Coronary artery disease:  Multiple PCIs as outlined above.  Echocardiogram with normal LV function, basal inferior WMA by echo in 2019.  Currently stable without symptoms concerning for obstructive CAD  2.  Hypertension:  At goal  3.  Hyperlipidemia:  At goal on combination zetia and crestor.  He is also maintained on gemfibrozil.  Due for fasting lipid panel.   4.  Impaired fasting glucose        RECOMMENDATIONS:  -Continue current cardiac meds; no changes made today  -Fasting blood work (including lab work for upcoming primary MD appointment)  -Follow up in 12  months or before than as necessary.    Julita Guzmán MD [facility]  Cardiology - [facility]  August 4, 2022      Thank you for allowing me to participate in the care of your patient.      Sincerely,     Julita Guzmán MD     [facility]  cc:   Julita Guzmán MD  [facility] AT [place]  [address] [facility] [address] [place] [id number]

## 2022-08-03 NOTE — PROGRESS NOTES
CARDIOLOGY CLINIC VISIT  DATE OF SERVICE:  August 3, 2022    PRIMARY CARE PHYSICIAN:  Devon Amin    HISTORY OF PRESENT ILLNESS:  Mr. Osei Jarrett is a pleasant 66-year-old male with past medical history significant for coronary artery disease, s/p multiple PCIs including PCI of the mid LAD (on 3/09/2001) with thrombectomy and PCI of the proximal LCX with kissing balloon to OM (on 6/03/2008) for acute inferior MI complicated by out of hospital cardiac arrest and most recent PCI with MARIANN x1 to the mid RCA (5/29/2019), hypertension, hyperlipidemia, diabetes mellitus (diet controlled), history of tobacco use (quit in 2008) . He was last seen by me virtually in August 2021 and presents today for follow up.    Previously, he was switched from atorvastatin to rosuvastatin and Zetia and his LDL is now at goal.    In follow up today, Willie continues to feel well.  He is enjoying his new home on [place] acres with a pool.  He bought snow mobiles.  He remains active.  He denies any exertional chest pain, chest discomfort or shortness of breath.       PAST MEDICAL HISTORY:  1.  Coronary artery disease:  S/P multiple PCIs of mid LAD (2001), PCI of proximal LCx with kissing balloon to OM (2008) for acute inferior MI complicated by out of hospital cardiac arrest, PCI with MARIANN to RCA (5/2019).  Normal LV function by echocardiogram 5/2019 EF 60-65% with basal inferior wall hypokinesis  2.  Hypertension  3.  Hyperlipidemia  4.  Diabetes type II    MEDICATIONS:  Current Outpatient Medications   Medication     ASPIRIN 81 PO     Cholecalciferol (VITAMIN D3) 50 MCG (2000 UT) CAPS     Flaxseed, Linseed, (FLAX SEED OIL) 1000 MG capsule     gemfibrozil (LOPID) 600 MG tablet     multivitamin w/minerals (MULTI-VITAMIN) tablet     nitroGLYcerin (NITROSTAT) 0.4 MG sublingual tablet     Omega-3 Fatty Acids (FISH OIL) 1200 MG capsule     ramipril (ALTACE) 5 MG capsule     No current facility-administered medications for this visit.        ALLERGIES:  No Known Allergies    SOCIAL HISTORY:  I have reviewed this patient's social history and updated it with pertinent information if needed. TAMMIE Jarrett  reports that he has quit smoking. He smoked 0.00 packs per day for 0.00 years. He has never used smokeless tobacco. He reports current alcohol use. He reports that he does not use drugs.    FAMILY HISTORY:  I have reviewed this patient's family history and updated it with pertinent information if needed.   No family history on file.    REVIEW OF SYSTEMS:  A complete ROS was obtained and the pertinent positives are outlined in the history of present illness above. The remainder of systems is negative.      PHYSICAL EXAM:                     Vital Signs with Ranges     0 lbs 0 oz    Constitutional: awake, alert, no distress  Eyes: PERRL, sclera nonicteric  ENT: trachea midline  Respiratory: CTAB  Cardiovascular: RRR no m/r/g,no JVD  GI: nondistended, nontender, bowel sounds present  Lymph/Hematologic: no lymphadenopathy  Skin: dry, no rash  Musculoskeletal: good muscle tone, no edema bilaterally  Neurologic: no focal deficits  Neuropsychiatric: appropriate affact          ASSESSMENT:  1.  Coronary artery disease:  Multiple PCIs as outlined above.  Echocardiogram with normal LV function, basal inferior WMA by echo in 2019.  Currently stable without symptoms concerning for obstructive CAD  2.  Hypertension:  At goal  3.  Hyperlipidemia:  At goal on combination zetia and crestor.  He is also maintained on gemfibrozil.  Due for fasting lipid panel.   4.  Impaired fasting glucose        RECOMMENDATIONS:  -Continue current cardiac meds; no changes made today  -Fasting blood work (including lab work for upcoming primary MD appointment)  -Follow up in 12 months or before than as necessary.    Julita Guzmán MD Located within [facility]  Cardiology - [facility] [[email or web address]] Heart  August 4, 2022

## 2022-08-04 ENCOUNTER — LAB (OUTPATIENT)
Dept: LAB | Facility: CLINIC | Age: 67
End: 2022-08-04
Payer: MEDICARE

## 2022-08-04 DIAGNOSIS — Z12.5 SCREENING FOR PROSTATE CANCER: ICD-10-CM

## 2022-08-04 DIAGNOSIS — R73.01 IMPAIRED FASTING GLUCOSE: ICD-10-CM

## 2022-08-04 DIAGNOSIS — I25.10 CORONARY ARTERY DISEASE WITHOUT ANGINA PECTORIS, UNSPECIFIED VESSEL OR LESION TYPE, UNSPECIFIED WHETHER NATIVE OR TRANSPLANTED HEART: ICD-10-CM

## 2022-08-04 DIAGNOSIS — E78.5 HYPERLIPIDEMIA LDL GOAL <70: ICD-10-CM

## 2022-08-04 LAB
ANION GAP SERPL CALCULATED.3IONS-SCNC: 1 MMOL/L (ref 3–14)
BUN SERPL-MCNC: 17 MG/DL (ref 7–30)
CALCIUM SERPL-MCNC: 9.5 MG/DL (ref 8.5–10.1)
CHLORIDE BLD-SCNC: 107 MMOL/L (ref 94–109)
CHOLEST SERPL-MCNC: 127 MG/DL
CO2 SERPL-SCNC: 28 MMOL/L (ref 20–32)
CREAT SERPL-MCNC: 1.1 MG/DL (ref 0.66–1.25)
FASTING STATUS PATIENT QL REPORTED: YES
GFR SERPL CREATININE-BSD FRML MDRD: 73 ML/MIN/1.73M2
GLUCOSE BLD-MCNC: 106 MG/DL (ref 70–99)
HBA1C MFR BLD: 5.8 % (ref 0–5.6)
HDLC SERPL-MCNC: 41 MG/DL
LDLC SERPL CALC-MCNC: 59 MG/DL
NONHDLC SERPL-MCNC: 86 MG/DL
POTASSIUM BLD-SCNC: 4.3 MMOL/L (ref 3.4–5.3)
PSA SERPL-MCNC: 0.27 UG/L (ref 0–4)
SODIUM SERPL-SCNC: 136 MMOL/L (ref 133–144)
TRIGL SERPL-MCNC: 137 MG/DL

## 2022-08-04 PROCEDURE — 80061 LIPID PANEL: CPT | Performed by: INTERNAL MEDICINE

## 2022-08-04 PROCEDURE — 36415 COLL VENOUS BLD VENIPUNCTURE: CPT | Performed by: INTERNAL MEDICINE

## 2022-08-04 PROCEDURE — 80048 BASIC METABOLIC PNL TOTAL CA: CPT | Performed by: INTERNAL MEDICINE

## 2022-08-04 PROCEDURE — 83036 HEMOGLOBIN GLYCOSYLATED A1C: CPT | Performed by: INTERNAL MEDICINE

## 2022-08-04 PROCEDURE — G0103 PSA SCREENING: HCPCS | Performed by: INTERNAL MEDICINE

## 2022-08-18 ASSESSMENT — ACTIVITIES OF DAILY LIVING (ADL): CURRENT_FUNCTION: NO ASSISTANCE NEEDED

## 2022-08-18 ASSESSMENT — ENCOUNTER SYMPTOMS
ARTHRALGIAS: 0
SHORTNESS OF BREATH: 0
CHILLS: 0
HEADACHES: 0
PALPITATIONS: 0
FEVER: 0
CONSTIPATION: 0
HEMATOCHEZIA: 0
HEARTBURN: 0
EYE PAIN: 0
ABDOMINAL PAIN: 0
JOINT SWELLING: 0
NAUSEA: 0
SORE THROAT: 0
FREQUENCY: 0
DIARRHEA: 0
WEAKNESS: 0
HEMATURIA: 0
DYSURIA: 0
COUGH: 0
PARESTHESIAS: 0
MYALGIAS: 1
DIZZINESS: 0
NERVOUS/ANXIOUS: 0

## 2022-08-25 ENCOUNTER — OFFICE VISIT (OUTPATIENT)
Dept: INTERNAL MEDICINE | Facility: CLINIC | Age: 67
End: 2022-08-25
Payer: MEDICARE

## 2022-08-25 VITALS
RESPIRATION RATE: 16 BRPM | TEMPERATURE: 97.3 F | HEIGHT: 72 IN | DIASTOLIC BLOOD PRESSURE: 74 MMHG | SYSTOLIC BLOOD PRESSURE: 118 MMHG | HEART RATE: 76 BPM | BODY MASS INDEX: 34.32 KG/M2 | WEIGHT: 253.4 LBS | OXYGEN SATURATION: 97 %

## 2022-08-25 DIAGNOSIS — E78.2 MIXED HYPERLIPIDEMIA: ICD-10-CM

## 2022-08-25 DIAGNOSIS — I25.10 ATHEROSCLEROSIS OF NATIVE CORONARY ARTERY OF NATIVE HEART WITHOUT ANGINA PECTORIS: ICD-10-CM

## 2022-08-25 DIAGNOSIS — Z11.59 NEED FOR HEPATITIS C SCREENING TEST: Primary | ICD-10-CM

## 2022-08-25 DIAGNOSIS — I10 BENIGN ESSENTIAL HYPERTENSION: ICD-10-CM

## 2022-08-25 DIAGNOSIS — Z00.00 MEDICARE ANNUAL WELLNESS VISIT, SUBSEQUENT: ICD-10-CM

## 2022-08-25 LAB
ERYTHROCYTE [DISTWIDTH] IN BLOOD BY AUTOMATED COUNT: 12.8 % (ref 10–15)
HCT VFR BLD AUTO: 39 % (ref 40–53)
HCV AB SERPL QL IA: NONREACTIVE
HGB BLD-MCNC: 13.3 G/DL (ref 13.3–17.7)
MCH RBC QN AUTO: 33.2 PG (ref 26.5–33)
MCHC RBC AUTO-ENTMCNC: 34.1 G/DL (ref 31.5–36.5)
MCV RBC AUTO: 97 FL (ref 78–100)
PLATELET # BLD AUTO: 199 10E3/UL (ref 150–450)
RBC # BLD AUTO: 4.01 10E6/UL (ref 4.4–5.9)
WBC # BLD AUTO: 5.8 10E3/UL (ref 4–11)

## 2022-08-25 PROCEDURE — 36415 COLL VENOUS BLD VENIPUNCTURE: CPT | Performed by: INTERNAL MEDICINE

## 2022-08-25 PROCEDURE — 86803 HEPATITIS C AB TEST: CPT | Performed by: INTERNAL MEDICINE

## 2022-08-25 PROCEDURE — 85027 COMPLETE CBC AUTOMATED: CPT | Performed by: INTERNAL MEDICINE

## 2022-08-25 PROCEDURE — G0439 PPPS, SUBSEQ VISIT: HCPCS | Performed by: INTERNAL MEDICINE

## 2022-08-25 ASSESSMENT — ENCOUNTER SYMPTOMS
ABDOMINAL PAIN: 0
WEAKNESS: 0
SHORTNESS OF BREATH: 0
EYE PAIN: 0
FEVER: 0
HEADACHES: 0
ARTHRALGIAS: 0
HEARTBURN: 0
CONSTIPATION: 0
JOINT SWELLING: 0
NAUSEA: 0
DYSURIA: 0
DIARRHEA: 0
FREQUENCY: 0
NERVOUS/ANXIOUS: 0
SORE THROAT: 0
CHILLS: 0
HEMATOCHEZIA: 0
DIZZINESS: 0
MYALGIAS: 1
PARESTHESIAS: 0
PALPITATIONS: 0
COUGH: 0
HEMATURIA: 0

## 2022-08-25 ASSESSMENT — PAIN SCALES - GENERAL: PAINLEVEL: MODERATE PAIN (4)

## 2022-08-25 ASSESSMENT — ACTIVITIES OF DAILY LIVING (ADL): CURRENT_FUNCTION: NO ASSISTANCE NEEDED

## 2022-08-25 NOTE — PROGRESS NOTES
"SUBJECTIVE:   Osei Jarrett is a 66 year old male who presents for Preventive Visit.      Patient has been advised of split billing requirements and indicates understanding: Yes  Are you in the first 12 months of your Medicare coverage?  No    Healthy Habits:     In general, how would you rate your overall health?  Good    Frequency of exercise:  1 day/week    Duration of exercise:  15-30 minutes    Do you usually eat at least 4 servings of fruit and vegetables a day, include whole grains    & fiber and avoid regularly eating high fat or \"junk\" foods?  No    Taking medications regularly:  Yes    Medication side effects:  None    Ability to successfully perform activities of daily living:  No assistance needed    Home Safety:  Lack of grab bars in the bathroom    Hearing Impairment:  No hearing concerns    In the past 6 months, have you been bothered by leaking of urine?  No    In general, how would you rate your overall mental or emotional health?  Good      PHQ-2 Total Score: 0    Additional concerns today:  No    Do you feel safe in your environment? Yes    Have you ever done Advance Care Planning? (For example, a Health Directive, POLST, or a discussion with a medical provider or your loved ones about your wishes): Yes, patient states has an Advance Care Planning document and will bring a copy to the clinic.      Fall risk  Fallen 2 or more times in the past year?: No  Any fall with injury in the past year?: No    Cognitive Screening   1) Repeat 3 items (Leader, Season, Table)    2) Clock draw: NORMAL  3) 3 item recall: Recalls 3 objects  Results: 3 items recalled: COGNITIVE IMPAIRMENT LESS LIKELY    Mini-CogTM Copyright MARSHA Major. Licensed by the author for use in [facility]; reprinted with permission (ebony@.[email or web address]). All rights reserved.      Do you have sleep apnea, excessive snoring or daytime drowsiness?: no    Reviewed and updated as needed this visit by clinical staff   Tobacco  Allergies  " Meds   Med Hx  Surg Hx  Fam Hx  Soc Hx          Reviewed and updated as needed this visit by Provider                   Social History     Tobacco Use     Smoking status: Former Smoker     Packs/day: 0.00     Years: 0.00     Pack years: 0.00     Smokeless tobacco: Never Used   Substance Use Topics     Alcohol use: Yes     If you drink alcohol do you typically have >3 drinks per day or >7 drinks per week? Yes      Alcohol Use 8/18/2022   Prescreen: >3 drinks/day or >7 drinks/week? Yes   Prescreen: >3 drinks/day or >7 drinks/week? -     AUDIT - Alcohol Use Disorders Identification Test - Reproduced from the World Health Organization Audit 2001 (Second Edition) 8/18/2022   1.  How often do you have a drink containing alcohol? 4 or more times a week   2.  How many drinks containing alcohol do you have on a typical day when you are drinking? 1 or 2   3.  How often do you have five or more drinks on one occasion? Never   9.  Have you or someone else been injured because of your drinking? No   10. Has a relative, friend, doctor or other health care worker been concerned about your drinking or suggested you cut down? No           -------------------------------------    Current providers sharing in care for this patient include:   Patient Care Team:  Devon Amin DO as PCP - General (Internal Medicine)  Devon Amin DO as Assigned PCP  Julita Guzmán MD as Assigned Heart and Vascular Provider    The following health maintenance items are reviewed in Epic and correct as of today:  Health Maintenance Due   Topic Date Due     ANNUAL REVIEW OF HM ORDERS  Never done     Pneumococcal Vaccine: 65+ Years (1 - PCV) Never done     HEPATITIS C SCREENING  Never done     ZOSTER IMMUNIZATION (1 of 2) Never done     LUNG CANCER SCREENING  Never done     MEDICARE ANNUAL WELLNESS VISIT  08/17/2022     INFLUENZA VACCINE (1) 09/01/2022     Lab work is in process  Labs reviewed in EPIC  BP Readings from  Last 3 Encounters:   08/25/22 118/74   08/03/22 124/86   08/17/21 108/80    Wt Readings from Last 3 Encounters:   08/25/22 114.9 kg (253 lb 6.4 oz)   08/03/22 115.8 kg (255 lb 4.8 oz)   08/17/21 113.9 kg (251 lb)                  Patient Active Problem List   Diagnosis     Coronary atherosclerosis     Encounter for colonoscopy in patient with family history of colon cancer     Mixed hyperlipidemia     Postsurgical percutaneous transluminal coronary angioplasty status     Hyperlipidemia     Past Surgical History:   Procedure Laterality Date     COLONOSCOPY N/A 8/20/2020    Procedure: Colonoscopy;  Surgeon: Ean Gerard MD;  Location:  GI       Social History     Tobacco Use     Smoking status: Former Smoker     Packs/day: 0.00     Years: 0.00     Pack years: 0.00     Smokeless tobacco: Never Used   Substance Use Topics     Alcohol use: Yes     History reviewed. No pertinent family history.      Current Outpatient Medications   Medication Sig Dispense Refill     ASPIRIN 81 PO        Cholecalciferol (VITAMIN D3) 50 MCG (2000 UT) CAPS Take 2,000 Units by mouth daily       ezetimibe (ZETIA) 10 MG tablet Take 10 mg by mouth daily       Flaxseed, Linseed, (FLAX SEED OIL) 1000 MG capsule Take 1 capsule by mouth daily Taking 1400mg twice daily       gemfibrozil (LOPID) 600 MG tablet TAKE 1 TABLET TWICE DAILY BEFORE MEALS 180 tablet 0     metoprolol succinate ER (TOPROL XL) 50 MG 24 hr tablet Take 50 mg by mouth daily       multivitamin w/minerals (THERA-VIT-M) tablet Take by mouth daily       nitroGLYcerin (NITROSTAT) 0.4 MG sublingual tablet Place 1 tablet (0.4 mg) under the tongue every 5 minutes as needed for chest pain For chest pain place 1 tablet under the tongue every 5 minutes for 3 doses. If symptoms persist 5 minutes after 1st dose call 911. 25 tablet 3     Omega-3 Fatty Acids (FISH OIL) 1200 MG capsule Take 1,200 mg by mouth 3 times daily       ramipril (ALTACE) 5 MG capsule TAKE 1 CAPSULE EVERY DAY 90 capsule  2     rosuvastatin (CRESTOR) 40 MG tablet Take 40 mg by mouth daily       No Known Allergies          Review of Systems   Constitutional: Negative for chills and fever.   HENT: Positive for hearing loss. Negative for congestion, ear pain and sore throat.    Eyes: Negative for pain and visual disturbance.   Respiratory: Negative for cough and shortness of breath.    Cardiovascular: Negative for chest pain, palpitations and peripheral edema.   Gastrointestinal: Negative for abdominal pain, constipation, diarrhea, heartburn, hematochezia and nausea.   Genitourinary: Negative for dysuria, frequency, genital sores, hematuria, impotence, penile discharge and urgency.   Musculoskeletal: Positive for myalgias. Negative for arthralgias and joint swelling.   Skin: Negative for rash.   Neurological: Negative for dizziness, weakness, headaches and paresthesias.   Psychiatric/Behavioral: Negative for mood changes. The patient is not nervous/anxious.          OBJECTIVE:   /74 (BP Location: Right arm, Patient Position: Chair, Cuff Size: Adult Large)   Pulse 76   Temp 97.3  F (36.3  C) (Temporal)   Resp 16   Ht 1.829 m (6')   Wt 114.9 kg (253 lb 6.4 oz)   SpO2 97%   BMI 34.37 kg/m   Estimated body mass index is 34.37 kg/m  as calculated from the following:    Height as of this encounter: 1.829 m (6').    Weight as of this encounter: 114.9 kg (253 lb 6.4 oz).  Physical Exam  GENERAL: healthy, alert and no distress  EYES: Eyes grossly normal to inspection, PERRL and conjunctivae and sclerae normal  HENT: ear canals and TM's normal, nose and mouth without ulcers or lesions  NECK: no adenopathy, no asymmetry, masses, or scars and thyroid normal to palpation  RESP: lungs clear to auscultation - no rales, rhonchi or wheezes  CV: regular rate and rhythm, normal S1 S2, no S3 or S4, no murmur, click or rub, no peripheral edema and peripheral pulses strong  ABDOMEN: soft, nontender, no hepatosplenomegaly, no masses and bowel  sounds normal  MS: no gross musculoskeletal defects noted, no edema  SKIN: no suspicious lesions or rashes  NEURO: Normal strength and tone, mentation intact and speech normal  PSYCH: mentation appears normal, affect normal/bright  LYMPH: no cervical, supraclavicular, axillary, or inguinal adenopathy    Diagnostic Test Results:  No results found for this or any previous visit (from the past 24 hour(s)).    ASSESSMENT / PLAN:       ICD-10-CM    1. Need for hepatitis C screening test  Z11.59 Hepatitis C Screen Reflex to HCV RNA Quant and Genotype     Hepatitis C Screen Reflex to HCV RNA Quant and Genotype   2. Medicare annual wellness visit, subsequent  Z00.00    3. Benign essential hypertension  I10 CBC with platelets     CBC with platelets   4. Mixed hyperlipidemia  E78.2    5. Atherosclerosis of native coronary artery of native heart without angina pectoris  I25.10        Patient has been advised of split billing requirements and indicates understanding: Yes    COUNSELING:  Reviewed preventive health counseling, as reflected in patient instructions       Regular exercise       Healthy diet/nutrition       Vision screening       Hearing screening       Dental care       Bladder control       Colon cancer screening       Prostate cancer screening    Estimated body mass index is 34.37 kg/m  as calculated from the following:    Height as of this encounter: 1.829 m (6').    Weight as of this encounter: 114.9 kg (253 lb 6.4 oz).    Weight management plan: Discussed healthy diet and exercise guidelines    He reports that he has quit smoking. He smoked 0.00 packs per day for 0.00 years. He has never used smokeless tobacco.      Appropriate preventive services were discussed with this patient, including applicable screening as appropriate for cardiovascular disease, diabetes, osteopenia/osteoporosis, and glaucoma.  As appropriate for age/gender, discussed screening for colorectal cancer, prostate cancer, breast cancer, and  cervical cancer. Checklist reviewing preventive services available has been given to the patient.    Reviewed patients plan of care and provided an AVS. The Basic Care Plan (routine screening as documented in Health Maintenance) for Osei meets the Care Plan requirement. This Care Plan has been established and reviewed with the Patient.    Counseling Resources:  ATP IV Guidelines  Pooled Cohorts Equation Calculator  Breast Cancer Risk Calculator  Breast Cancer: Medication to Reduce Risk  FRAX Risk Assessment  ICSI Preventive Guidelines  Dietary Guidelines for Americans, 2010  USDA's MyPlate  ASA Prophylaxis  Lung CA Screening    Devon Amin [facility]    Identified Health Risks:

## 2022-09-22 DIAGNOSIS — E78.2 MIXED HYPERLIPIDEMIA: ICD-10-CM

## 2022-09-27 RX ORDER — GEMFIBROZIL 600 MG/1
TABLET, FILM COATED ORAL
Qty: 180 TABLET | Refills: 2 | Status: SHIPPED | OUTPATIENT
Start: 2022-09-27 | End: 2023-06-26

## 2022-09-27 NOTE — TELEPHONE ENCOUNTER
Pending Prescriptions:                       Disp   Refills    gemfibrozil (LOPID) 600 MG tablet [Pharmac*180 ta*2        Sig: TAKE 1 TABLET TWICE DAILY BEFORE MEALS (NEEDS VISIT           FOR FURTHER REFILLS)    Routing refill request to provider for review/approval because:  Drug interaction warning

## 2022-11-25 DIAGNOSIS — E78.2 MIXED HYPERLIPIDEMIA: Primary | ICD-10-CM

## 2022-11-25 NOTE — TELEPHONE ENCOUNTER
Medication is patient reported, has been on for many years, he thinks it was filled previously by other provider or cardiology.  Would like to have Dr Amin start to fill in order to keep better tabs on medications and refills with his primary.    Please approve refill if in agreement to request.    April Sherwood XRO/

## 2022-11-28 RX ORDER — ROSUVASTATIN CALCIUM 40 MG/1
40 TABLET, COATED ORAL DAILY
Qty: 90 TABLET | Refills: 3 | Status: SHIPPED | OUTPATIENT
Start: 2022-11-28 | End: 2023-03-31

## 2023-01-11 DIAGNOSIS — E78.2 MIXED HYPERLIPIDEMIA: Primary | ICD-10-CM

## 2023-01-11 RX ORDER — EZETIMIBE 10 MG/1
10 TABLET ORAL DAILY
Qty: 90 TABLET | Refills: 3 | Status: SHIPPED | OUTPATIENT
Start: 2023-01-11 | End: 2024-01-08

## 2023-01-11 NOTE — TELEPHONE ENCOUNTER
"Requested Prescriptions   Pending Prescriptions Disp Refills    ezetimibe (ZETIA) 10 MG tablet 90 tablet 3     Sig: Take 1 tablet (10 mg) by mouth daily       Antihyperlipidemic agents Failed - 1/11/2023  9:34 AM        Failed - Normal serum ALT on record in past 12 mos     Recent Labs   Lab Test 08/03/20  0848   ALT 49             Passed - Lipid panel on file in past 12 mos     Recent Labs   Lab Test 08/04/22  0757   CHOL 127   TRIG 137   HDL 41   LDL 59   NHDL 86               Passed - Recent (12 mo) or future (30 days) visit within the authorizing provider's specialty     Patient has had an office visit with the authorizing provider or a provider within the authorizing providers department within the previous 12 mos or has a future within next 30 days. See \"Patient Info\" tab in inbasket, or \"Choose Columns\" in Meds & Orders section of the refill encounter.              Passed - Medication is active on med list        Passed - Patient is age 18 years or older             "

## 2023-01-11 NOTE — TELEPHONE ENCOUNTER
Patient has switched to [facility] pharmacy.   Please send Rx as soon as possible as patient is receiving from mail order.

## 2023-02-03 DIAGNOSIS — I10 BENIGN ESSENTIAL HYPERTENSION: Primary | ICD-10-CM

## 2023-02-03 RX ORDER — METOPROLOL SUCCINATE 50 MG/1
50 TABLET, EXTENDED RELEASE ORAL DAILY
Qty: 90 TABLET | Refills: 1 | Status: SHIPPED | OUTPATIENT
Start: 2023-02-03 | End: 2023-07-18

## 2023-02-03 NOTE — TELEPHONE ENCOUNTER
"Requested Prescriptions   Pending Prescriptions Disp Refills    metoprolol succinate ER (TOPROL XL) 50 MG 24 hr tablet 90 tablet 1     Sig: Take 1 tablet (50 mg) by mouth daily       Beta-Blockers Protocol Passed - 2/3/2023  1:44 PM        Passed - Blood pressure under 140/90 in past 12 months     BP Readings from Last 3 Encounters:   08/25/22 118/74   08/03/22 124/86   08/17/21 108/80                 Passed - Patient is age 6 or older        Passed - Recent (12 mo) or future (30 days) visit within the authorizing provider's specialty     Patient has had an office visit with the authorizing provider or a provider within the authorizing providers department within the previous 12 mos or has a future within next 30 days. See \"Patient Info\" tab in inbasket, or \"Choose Columns\" in Meds & Orders section of the refill encounter.              Passed - Medication is active on med list          Historical    Ching Sr RN  "

## 2023-03-31 DIAGNOSIS — E78.2 MIXED HYPERLIPIDEMIA: ICD-10-CM

## 2023-03-31 NOTE — TELEPHONE ENCOUNTER
Pending Prescriptions:                       Disp   Refills    rosuvastatin (CRESTOR) 40 MG tablet        90 tab*0        Sig: Take 1 tablet (40 mg) by mouth daily      Routing refill request to provider for review/approval because:  Drug interaction warning    Caterina Montero RN on 3/31/2023 at 4:59 PM

## 2023-04-03 RX ORDER — ROSUVASTATIN CALCIUM 40 MG/1
40 TABLET, COATED ORAL DAILY
Qty: 90 TABLET | Refills: 0 | Status: SHIPPED | OUTPATIENT
Start: 2023-04-03 | End: 2023-06-28

## 2023-04-22 ENCOUNTER — HEALTH MAINTENANCE LETTER (OUTPATIENT)
Age: 68
End: 2023-04-22

## 2023-06-24 DIAGNOSIS — Z79.899 HIGH RISK MEDICATION USE: Primary | ICD-10-CM

## 2023-06-24 DIAGNOSIS — E78.2 MIXED HYPERLIPIDEMIA: ICD-10-CM

## 2023-06-24 NOTE — TELEPHONE ENCOUNTER
Reason for call:  Medication   If this is a refill request, has the caller requested the refill from the pharmacy already? N/A  Will the patient be using a [facility]? No  Name of the pharmacy and phone number for the current request:   [facility]      Name of the medication requested:   gemfibrozil (LOPID) 600 MG tablet    Other request:   Has two refills left   Please keep patient updated and thank you    Phone number to reach patient:  Cell number on file:    Telephone Information:   Mobile [phone]       Best Time:  any    Can we leave a detailed message on this number?  YES    Travel screening: Not Applicable

## 2023-06-26 NOTE — TELEPHONE ENCOUNTER
Routing refill request to provider for review/approval because:  Drug interaction warning      Bobbi Martinez,SHON, RN

## 2023-06-27 DIAGNOSIS — E78.2 MIXED HYPERLIPIDEMIA: ICD-10-CM

## 2023-06-27 RX ORDER — GEMFIBROZIL 600 MG/1
TABLET, FILM COATED ORAL
Qty: 180 TABLET | Refills: 2 | Status: SHIPPED | OUTPATIENT
Start: 2023-06-27 | End: 2024-01-08

## 2023-06-27 NOTE — TELEPHONE ENCOUNTER
I have refilled the patient's prescription.    I have also placed orders for laboratory work.  Please contact the patient and have him set this up at his earliest convenience.    Thank you.    Brennan

## 2023-06-28 RX ORDER — ROSUVASTATIN CALCIUM 40 MG/1
TABLET, COATED ORAL
Qty: 90 TABLET | Refills: 0 | Status: SHIPPED | OUTPATIENT
Start: 2023-06-28 | End: 2023-09-08

## 2023-06-28 NOTE — TELEPHONE ENCOUNTER
Pending Prescriptions:                       Disp   Refills    rosuvastatin (CRESTOR) 40 MG tablet [Pharm*90 tab*0        Sig: TAKE 1 TABLET DAILY    Routing refill request to provider for review/approval because:  Drug interaction warning

## 2023-07-06 ENCOUNTER — LAB (OUTPATIENT)
Dept: LAB | Facility: CLINIC | Age: 68
End: 2023-07-06
Payer: MEDICARE

## 2023-07-06 DIAGNOSIS — E78.2 MIXED HYPERLIPIDEMIA: ICD-10-CM

## 2023-07-06 DIAGNOSIS — Z79.899 HIGH RISK MEDICATION USE: ICD-10-CM

## 2023-07-06 LAB
ALBUMIN SERPL BCG-MCNC: 4.4 G/DL (ref 3.5–5.2)
ALBUMIN UR-MCNC: 30 MG/DL
ALP SERPL-CCNC: 67 U/L (ref 40–129)
ALT SERPL W P-5'-P-CCNC: 35 U/L (ref 0–70)
ANION GAP SERPL CALCULATED.3IONS-SCNC: 10 MMOL/L (ref 7–15)
APPEARANCE UR: ABNORMAL
AST SERPL W P-5'-P-CCNC: 31 U/L (ref 0–45)
BILIRUB DIRECT SERPL-MCNC: <0.2 MG/DL (ref 0–0.3)
BILIRUB SERPL-MCNC: 0.6 MG/DL
BILIRUB UR QL STRIP: NEGATIVE
BUN SERPL-MCNC: 19.1 MG/DL (ref 8–23)
CALCIUM SERPL-MCNC: 9.7 MG/DL (ref 8.8–10.2)
CHLORIDE SERPL-SCNC: 104 MMOL/L (ref 98–107)
CHOLEST SERPL-MCNC: 146 MG/DL
COLOR UR AUTO: YELLOW
CREAT SERPL-MCNC: 1.01 MG/DL (ref 0.67–1.17)
DEPRECATED HCO3 PLAS-SCNC: 23 MMOL/L (ref 22–29)
GFR SERPL CREATININE-BSD FRML MDRD: 81 ML/MIN/1.73M2
GLUCOSE SERPL-MCNC: 125 MG/DL (ref 70–99)
GLUCOSE UR STRIP-MCNC: NEGATIVE MG/DL
HDLC SERPL-MCNC: 46 MG/DL
HGB UR QL STRIP: NEGATIVE
HYALINE CASTS: 3 /LPF
KETONES UR STRIP-MCNC: NEGATIVE MG/DL
LDLC SERPL CALC-MCNC: 72 MG/DL
LEUKOCYTE ESTERASE UR QL STRIP: NEGATIVE
MUCOUS THREADS #/AREA URNS LPF: PRESENT /LPF
NITRATE UR QL: NEGATIVE
NONHDLC SERPL-MCNC: 100 MG/DL
PH UR STRIP: 5 [PH] (ref 5–7)
POTASSIUM SERPL-SCNC: 4 MMOL/L (ref 3.4–5.3)
PROT SERPL-MCNC: 7.3 G/DL (ref 6.4–8.3)
RBC URINE: 2 /HPF
SODIUM SERPL-SCNC: 137 MMOL/L (ref 136–145)
SP GR UR STRIP: 1.02 (ref 1–1.03)
TRIGL SERPL-MCNC: 141 MG/DL
UROBILINOGEN UR STRIP-MCNC: NORMAL MG/DL
WBC URINE: 1 /HPF

## 2023-07-06 PROCEDURE — 80061 LIPID PANEL: CPT

## 2023-07-06 PROCEDURE — 80053 COMPREHEN METABOLIC PANEL: CPT

## 2023-07-06 PROCEDURE — 81001 URINALYSIS AUTO W/SCOPE: CPT

## 2023-07-06 PROCEDURE — 36415 COLL VENOUS BLD VENIPUNCTURE: CPT

## 2023-07-06 PROCEDURE — 82248 BILIRUBIN DIRECT: CPT

## 2023-07-16 DIAGNOSIS — I10 BENIGN ESSENTIAL HYPERTENSION: ICD-10-CM

## 2023-07-18 RX ORDER — METOPROLOL SUCCINATE 50 MG/1
TABLET, EXTENDED RELEASE ORAL
Qty: 90 TABLET | Refills: 0 | Status: SHIPPED | OUTPATIENT
Start: 2023-07-18 | End: 2023-10-13

## 2023-08-02 ENCOUNTER — OFFICE VISIT (OUTPATIENT)
Dept: CARDIOLOGY | Facility: CLINIC | Age: 68
End: 2023-08-02
Attending: INTERNAL MEDICINE
Payer: MEDICARE

## 2023-08-02 VITALS
BODY MASS INDEX: 34.35 KG/M2 | HEIGHT: 72 IN | OXYGEN SATURATION: 97 % | DIASTOLIC BLOOD PRESSURE: 74 MMHG | WEIGHT: 253.6 LBS | SYSTOLIC BLOOD PRESSURE: 112 MMHG | HEART RATE: 67 BPM

## 2023-08-02 DIAGNOSIS — I25.10 CORONARY ARTERY DISEASE WITHOUT ANGINA PECTORIS, UNSPECIFIED VESSEL OR LESION TYPE, UNSPECIFIED WHETHER NATIVE OR TRANSPLANTED HEART: ICD-10-CM

## 2023-08-02 PROCEDURE — 99214 OFFICE O/P EST MOD 30 MIN: CPT | Performed by: INTERNAL MEDICINE

## 2023-08-02 NOTE — PROGRESS NOTES
CARDIOLOGY CLINIC VISIT  DATE OF SERVICE:  August 2 2023    PRIMARY CARE PHYSICIAN:  Devon Amin    HISTORY OF PRESENT ILLNESS:  Mr. Osei Jarrett is a pleasant 67-year-old male with past medical history significant for coronary artery disease, s/p multiple PCIs including PCI of the mid LAD (on 3/09/2001) with thrombectomy and PCI of the proximal LCX with kissing balloon to OM (on 6/03/2008) for acute inferior MI complicated by out of hospital cardiac arrest and most recent PCI with MARIANN x1 to the mid RCA (5/29/2019), hypertension, hyperlipidemia, diabetes mellitus (diet controlled), history of tobacco use (quit in 2008) . He was last seen by me in August 2022 and presents today for follow up.    Previously, he was switched from atorvastatin to rosuvastatin and Zetia and his LDL is now at goal.    In follow up today, Willie has no concerns.  He is active on his 2 acre property and does a lot of traveling.  He is enjoying prison.  He denies any exertional chest pain, chest discomfort or shortness of breath.  He denies any light-headedness or dizziness.  He is otherwise without complaints.       PAST MEDICAL HISTORY:  1.  Coronary artery disease:  S/P multiple PCIs of mid LAD (2001), PCI of proximal LCx with kissing balloon to OM (2008) for acute inferior MI complicated by out of hospital cardiac arrest, PCI with MARIANN to RCA (5/2019).  Normal LV function by echocardiogram 5/2019 EF 60-65% with basal inferior wall hypokinesis  2.  Hypertension  3.  Hyperlipidemia  4.  Diabetes type II    MEDICATIONS:  Current Outpatient Medications   Medication    ASPIRIN 81 PO    Cholecalciferol (VITAMIN D3) 50 MCG (2000 UT) CAPS    ezetimibe (ZETIA) 10 MG tablet    Flaxseed, Linseed, (FLAX SEED OIL) 1000 MG capsule    gemfibrozil (LOPID) 600 MG tablet    metoprolol succinate ER (TOPROL XL) 50 MG 24 hr tablet    multivitamin w/minerals (THERA-VIT-M) tablet    Omega-3 Fatty Acids (FISH OIL) 1200 MG capsule    ramipril  (ALTACE) 5 MG capsule    rosuvastatin (CRESTOR) 40 MG tablet    nitroGLYcerin (NITROSTAT) 0.4 MG sublingual tablet     No current facility-administered medications for this visit.       ALLERGIES:  No Known Allergies    SOCIAL HISTORY:  I have reviewed this patient's social history and updated it with pertinent information if needed. TAMMIE Jarrett  reports that he has quit smoking. His smoking use included cigarettes. He has never used smokeless tobacco. He reports current alcohol use. He reports that he does not use drugs.    FAMILY HISTORY:  I have reviewed this patient's family history and updated it with pertinent information if needed.   No family history on file.    REVIEW OF SYSTEMS:  A complete ROS was obtained and the pertinent positives are outlined in the history of present illness above. The remainder of systems is negative.      PHYSICAL EXAM:      BP: 112/74 Pulse: 67     SpO2: 97 %      Vital Signs with Ranges  Pulse:  [67] 67  BP: (112)/(74) 112/74  SpO2:  [97 %] 97 %  253 lbs 9.6 oz    Constitutional: awake, alert, no distress  Eyes: PERRL, sclera nonicteric  ENT: trachea midline  Respiratory: CTAB  Cardiovascular: RRR no m/r/g,no JVD  GI: nondistended, nontender, bowel sounds present  Lymph/Hematologic: no lymphadenopathy  Skin: dry, no rash  Musculoskeletal: good muscle tone, no edema bilaterally  Neurologic: no focal deficits  Neuropsychiatric: appropriate affact          ASSESSMENT:  1.  Coronary artery disease:  Stable; no symptoms concerning for recurrent obstructive CAD.  Multiple PCIs as outlined above.  Echocardiogram with normal LV function, basal inferior WMA by echo in 2019.    2.  Hypertension:  At goal  3.  Hyperlipidemia:  At goal on combination zetia and crestor.  He is also maintained on gemfibrozil.    4.  Impaired fasting glucose    RECOMMENDATIONS:  -Willie is doing very well from a cardiac standpoint.  No changes in medication management  -He has a physical coming up with his  primary care physician in September; lab work to be drawn at that time  -Follow up in one year or before than as necessary    Julita Guzmán MD [facility]  Cardiology - [facility]  August 2, 2023

## 2023-08-02 NOTE — LETTER
8/2/2023    Devno Amin, DO  [address] [place] [address]    RE: Osei Jarrett       Dear Colleague,     I had the pleasure of seeing Osei Jarrett in the [facility].  CARDIOLOGY CLINIC VISIT  DATE OF SERVICE:  August 2 2023    PRIMARY CARE PHYSICIAN:  Devon Amin    HISTORY OF PRESENT ILLNESS:  Mr. Osei Jarrett is a pleasant 67-year-old male with past medical history significant for coronary artery disease, s/p multiple PCIs including PCI of the mid LAD (on 3/09/2001) with thrombectomy and PCI of the proximal LCX with kissing balloon to OM (on 6/03/2008) for acute inferior MI complicated by out of hospital cardiac arrest and most recent PCI with MARIANN x1 to the mid RCA (5/29/2019), hypertension, hyperlipidemia, diabetes mellitus (diet controlled), history of tobacco use (quit in 2008) . He was last seen by me in August 2022 and presents today for follow up.    Previously, he was switched from atorvastatin to rosuvastatin and Zetia and his LDL is now at goal.    In follow up today, Willie has no concerns.  He is active on his [place] acre property and does a lot of traveling.  He is enjoying [employer].  He denies any exertional chest pain, chest discomfort or shortness of breath.  He denies any light-headedness or dizziness.  He is otherwise without complaints.       PAST MEDICAL HISTORY:  1.  Coronary artery disease:  S/P multiple PCIs of mid LAD (2001), PCI of proximal LCx with kissing balloon to OM (2008) for acute inferior MI complicated by out of hospital cardiac arrest, PCI with MARIANN to RCA (5/2019).  Normal LV function by echocardiogram 5/2019 EF 60-65% with basal inferior wall hypokinesis  2.  Hypertension  3.  Hyperlipidemia  4.  Diabetes type II    MEDICATIONS:  Current Outpatient Medications   Medication    ASPIRIN 81 PO    Cholecalciferol (VITAMIN D3) 50 MCG (2000 UT) CAPS    ezetimibe (ZETIA) 10 MG tablet    Flaxseed, Linseed, (FLAX SEED OIL) 1000 MG capsule     gemfibrozil (LOPID) 600 MG tablet    metoprolol succinate ER (TOPROL XL) 50 MG 24 hr tablet    multivitamin w/minerals (THERA-VIT-M) tablet    Omega-3 Fatty Acids (FISH OIL) 1200 MG capsule    ramipril (ALTACE) 5 MG capsule    rosuvastatin (CRESTOR) 40 MG tablet    nitroGLYcerin (NITROSTAT) 0.4 MG sublingual tablet     No current facility-administered medications for this visit.       ALLERGIES:  No Known Allergies    SOCIAL HISTORY:  I have reviewed this patient's social history and updated it with pertinent information if needed. TAMMIE Jarrett  reports that he has quit smoking. His smoking use included cigarettes. He has never used smokeless tobacco. He reports current alcohol use. He reports that he does not use drugs.    FAMILY HISTORY:  I have reviewed this patient's family history and updated it with pertinent information if needed.   No family history on file.    REVIEW OF SYSTEMS:  A complete ROS was obtained and the pertinent positives are outlined in the history of present illness above. The remainder of systems is negative.      PHYSICAL EXAM:      BP: 112/74 Pulse: 67     SpO2: 97 %      Vital Signs with Ranges  Pulse:  [67] 67  BP: (112)/(74) 112/74  SpO2:  [97 %] 97 %  253 lbs 9.6 oz    Constitutional: awake, alert, no distress  Eyes: PERRL, sclera nonicteric  ENT: trachea midline  Respiratory: CTAB  Cardiovascular: RRR no m/r/g,no JVD  GI: nondistended, nontender, bowel sounds present  Lymph/Hematologic: no lymphadenopathy  Skin: dry, no rash  Musculoskeletal: good muscle tone, no edema bilaterally  Neurologic: no focal deficits  Neuropsychiatric: appropriate affact          ASSESSMENT:  1.  Coronary artery disease:  Stable; no symptoms concerning for recurrent obstructive CAD.  Multiple PCIs as outlined above.  Echocardiogram with normal LV function, basal inferior WMA by echo in 2019.    2.  Hypertension:  At goal  3.  Hyperlipidemia:  At goal on combination zetia and crestor.  He is also  maintained on gemfibrozil.    4.  Impaired fasting glucose    RECOMMENDATIONS:  -Willie is doing very well from a cardiac standpoint.  No changes in medication management  -He has a physical coming up with his primary care physician in September; lab work to be drawn at that time  -Follow up in one year or before than as necessary    Julita Guzmán MD [facility]  Cardiology - [facility]  August 2, 2023      Thank you for allowing me to participate in the care of your patient.      Sincerely,     Julita Guzmán MD     [facility]  cc:   Julita Guzmán MD  [address] [address]

## 2023-08-18 NOTE — PROGRESS NOTES
"SUBJECTIVE:   Willie Jarrett is a 64 year old male who presents for Preventive Visit.  Are you in the first 12 months of your Medicare coverage?  No    Healthy Habits:     In general, how would you rate your overall health?  Good    Frequency of exercise:  None    Do you usually eat at least 4 servings of fruit and vegetables a day, include whole grains    & fiber and avoid regularly eating high fat or \"junk\" foods?  No    Taking medications regularly:  Yes    Medication side effects:  None    Ability to successfully perform activities of daily living:  No assistance needed    Home Safety:  No safety concerns identified    Hearing Impairment:  No hearing concerns    In the past 6 months, have you been bothered by leaking of urine?  No    In general, how would you rate your overall mental or emotional health?  Excellent      PHQ-2 Total Score: 0    Additional concerns today:  No    Do you feel safe in your environment? Yes    Have you ever done Advance Care Planning? (For example, a Health Directive, POLST, or a discussion with a medical provider or your loved ones about your wishes): No, advance care planning information given to patient to review.  Patient plans to discuss their wishes with loved ones or provider.        Fall risk  Fallen 2 or more times in the past year?: No  Any fall with injury in the past year?: No    Cognitive Screening   1) Repeat 3 items (Leader, Season, Table)    2) Clock draw: NORMAL  3) 3 item recall: Recalls 3 objects  Results: 3 items recalled: COGNITIVE IMPAIRMENT LESS LIKELY    Mini-CogTM Copyright MARSHA Major. Licensed by the author for use in [facility]; reprinted with permission (ebony@.[facility]). All rights reserved.      Do you have sleep apnea, excessive snoring or daytime drowsiness?: no    Reviewed and updated as needed this visit by clinical staff  Tobacco  Allergies  Meds         Reviewed and updated as needed this visit by Provider        Social History     Tobacco Use "     Smoking status: Former Smoker     Smokeless tobacco: Never Used   Substance Use Topics     Alcohol use: Yes         Alcohol Use 8/3/2020   Prescreen: >3 drinks/day or >7 drinks/week? No           -------------------------------------    Current providers sharing in care for this patient include:   Patient Care Team:  Clinic, [facility] as PCP - General (Gastroenterology)    The following health maintenance items are reviewed in Epic and correct as of today:  Health Maintenance   Topic Date Due     HEPATITIS C SCREENING  1955     ADVANCE CARE PLANNING  1955     COLORECTAL CANCER SCREENING  10/27/1963     ZOSTER IMMUNIZATION (1 of 2) 10/27/2003     LIPID  06/04/2013     MEDICARE ANNUAL WELLNESS VISIT  10/27/2018     FALL RISK ASSESSMENT  10/27/2018     PNEUMOCOCCAL IMMUNIZATION 65+ LOW/MEDIUM RISK (1 of 2 - PCV13) 10/27/2018     AORTIC ANEURYSM SCREENING (SYSTEM ASSIGNED)  10/27/2018     PHQ-2  01/01/2020     INFLUENZA VACCINE (1) 09/01/2020     DTAP/TDAP/TD IMMUNIZATION (2 - Td) 07/15/2023     IPV IMMUNIZATION  Aged Out     MENINGITIS IMMUNIZATION  Aged Out     HEPATITIS B IMMUNIZATION  Aged Out     Lab work is in process  BP Readings from Last 3 Encounters:   08/03/20 108/72    Wt Readings from Last 3 Encounters:   08/03/20 108.4 kg (239 lb)   07/29/20 106.6 kg (235 lb)                  Patient Active Problem List   Diagnosis     Coronary atherosclerosis     Encounter for colonoscopy in patient with family history of colon cancer     Mixed hyperlipidemia     Postsurgical percutaneous transluminal coronary angioplasty status     Hyperlipidemia     No past surgical history on file.    Social History     Tobacco Use     Smoking status: Former Smoker     Smokeless tobacco: Never Used   Substance Use Topics     Alcohol use: Yes     No family history on file.      Current Outpatient Medications   Medication Sig Dispense Refill     ASPIRIN 81 PO        Cholecalciferol (VITAMIN D3) 50 MCG (2000 UT) CAPS Take  2,000 Units by mouth daily       ezetimibe (ZETIA) 10 MG tablet Take 10 mg by mouth daily       Flaxseed, Linseed, (FLAX SEED OIL) 1000 MG capsule Take 1 capsule by mouth daily Taking 1400mg twice daily       gemfibrozil (LOPID) 600 MG tablet Take 600 mg by mouth 2 times daily (before meals)       metoprolol succinate ER (TOPROL-XL) 50 MG 24 hr tablet Take 50 mg by mouth daily       multivitamin w/minerals (MULTI-VITAMIN) tablet Take by mouth daily       Omega-3 Fatty Acids (FISH OIL) 1200 MG capsule Take 1,200 mg by mouth 3 times daily       ramipril (ALTACE) 5 MG capsule Take 5 mg by mouth daily       rosuvastatin (CRESTOR) 40 MG tablet Take 40 mg by mouth daily       ticagrelor (BRILINTA) 90 MG tablet Take 90 mg by mouth 2 times daily       nitroGLYcerin (NITROSTAT) 0.4 MG sublingual tablet Place 0.4 mg under the tongue every 5 minutes as needed for chest pain For chest pain place 1 tablet under the tongue every 5 minutes for 3 doses. If symptoms persist 5 minutes after 1st dose call 911.       No Known Allergies      Review of Systems  CONSTITUTIONAL: NEGATIVE for fever, chills, change in weight  INTEGUMENTARY/SKIN: NEGATIVE for worrisome rashes, moles or lesions  EYES: NEGATIVE for vision changes or irritation  ENT/MOUTH: NEGATIVE for ear, mouth and throat problems  RESP: NEGATIVE for significant cough or SOB  CV: Patient denies chest pain, arrhythmia, syncope.  His history of previous angioplasties about 5 years ago.  GI: NEGATIVE for nausea, abdominal pain, heartburn, or change in bowel habits.  History of benign colon polyps 3 years ago.  Due for colonoscopy.  : NEGATIVE for frequency, dysuria, or hematuria  MUSCULOSKELETAL: NEGATIVE for significant arthralgias or myalgia  NEURO: NEGATIVE for weakness, dizziness or paresthesias  ENDOCRINE: NEGATIVE for temperature intolerance, skin/hair changes  HEME: NEGATIVE for bleeding problems  PSYCHIATRIC: NEGATIVE for changes in mood or affect    OBJECTIVE:   BP  108/72 (BP Location: Right arm, Patient Position: Sitting, Cuff Size: Adult Regular)   Pulse 68   Temp 96.7  F (35.9  C) (Temporal)   Resp 16   Ht 1.829 m (6')   Wt 108.4 kg (239 lb)   SpO2 97%   BMI 32.41 kg/m   Estimated body mass index is 32.41 kg/m  as calculated from the following:    Height as of this encounter: 1.829 m (6').    Weight as of this encounter: 108.4 kg (239 lb).  Physical Exam  GENERAL: healthy, alert and no distress  EYES: Eyes grossly normal to inspection, PERRL and conjunctivae and sclerae normal  HENT: ear canals and TM's normal, nose and mouth without ulcers or lesions  NECK: no adenopathy, no asymmetry, masses, or scars and thyroid normal to palpation  RESP: lungs clear to auscultation - no rales, rhonchi or wheezes  CV: regular rate and rhythm, normal S1 S2, no S3 or S4, no murmur, click or rub, no peripheral edema and peripheral pulses strong  ABDOMEN: soft, nontender, no hepatosplenomegaly, no masses and bowel sounds normal  MS: no gross musculoskeletal defects noted, no edema  SKIN: no suspicious lesions or rashes  NEURO: Normal strength and tone, mentation intact and speech normal  PSYCH: mentation appears normal, affect normal/bright    Diagnostic Test Results:  No results found for this or any previous visit (from the past 24 hour(s)).    ASSESSMENT / PLAN:       ICD-10-CM    1. Atherosclerosis of native coronary artery of native heart without angina pectoris  I25.10 Hepatic panel   2. Mixed hyperlipidemia  E78.2 Lipid Profile   3. Benign essential hypertension  I10 Basic metabolic panel  (Ca, Cl, CO2, Creat, Gluc, K, Na, BUN)     *UA reflex to Microscopic and Culture ([place]; [facility]; [facility]; [facility]; [facility]; [facility]; [place]; [place])   4. Screening for prostate cancer  Z12.5 PSA, screen   5. Screen for colon cancer  Z12.11 GASTROENTEROLOGY ADULT REF PROCEDURE ONLY       COUNSELING:  Reviewed preventive health counseling, as reflected  in patient instructions       Regular exercise       Healthy diet/nutrition       Vision screening       Hearing screening       Dental care       Bladder control       Colon cancer screening       Prostate cancer screening    Estimated body mass index is 32.41 kg/m  as calculated from the following:    Height as of this encounter: 1.829 m (6').    Weight as of this encounter: 108.4 kg (239 lb).    Weight management plan: Discussed healthy diet and exercise guidelines     reports that he has quit smoking. He has never used smokeless tobacco.      Appropriate preventive services were discussed with this patient, including applicable screening as appropriate for cardiovascular disease, diabetes, osteopenia/osteoporosis, and glaucoma.  As appropriate for age/gender, discussed screening for colorectal cancer, prostate cancer, breast cancer, and cervical cancer. Checklist reviewing preventive services available has been given to the patient.    Reviewed patients plan of care and provided an AVS. The Basic Care Plan (routine screening as documented in Health Maintenance) for Willie meets the Care Plan requirement. This Care Plan has been established and reviewed with the Patient.    Counseling Resources:  ATP IV Guidelines  Pooled Cohorts Equation Calculator  Breast Cancer Risk Calculator  FRAX Risk Assessment  ICSI Preventive Guidelines  Dietary Guidelines for Americans, 2010  USDA's MyPlate  ASA Prophylaxis  Lung CA Screening    Devon Amin DO  [facility]    Identified Health Risks:   171

## 2023-08-28 ASSESSMENT — ENCOUNTER SYMPTOMS
FEVER: 0
ABDOMINAL PAIN: 0
FREQUENCY: 0
NAUSEA: 0
HEARTBURN: 1
PALPITATIONS: 0
ARTHRALGIAS: 1
DIZZINESS: 0
NERVOUS/ANXIOUS: 0
JOINT SWELLING: 0
HEMATURIA: 0
EYE PAIN: 0
CHILLS: 0
COUGH: 1
WEAKNESS: 0
MYALGIAS: 0
CONSTIPATION: 0
PARESTHESIAS: 0
HEMATOCHEZIA: 0
DYSURIA: 0
DIARRHEA: 0
SORE THROAT: 0
HEADACHES: 0
SHORTNESS OF BREATH: 0

## 2023-08-28 ASSESSMENT — ACTIVITIES OF DAILY LIVING (ADL): CURRENT_FUNCTION: NO ASSISTANCE NEEDED

## 2023-09-01 ENCOUNTER — OFFICE VISIT (OUTPATIENT)
Dept: INTERNAL MEDICINE | Facility: CLINIC | Age: 68
End: 2023-09-01
Payer: MEDICARE

## 2023-09-01 VITALS
BODY MASS INDEX: 33.86 KG/M2 | HEART RATE: 69 BPM | DIASTOLIC BLOOD PRESSURE: 80 MMHG | WEIGHT: 250 LBS | SYSTOLIC BLOOD PRESSURE: 118 MMHG | TEMPERATURE: 97.5 F | OXYGEN SATURATION: 96 % | HEIGHT: 72 IN | RESPIRATION RATE: 16 BRPM

## 2023-09-01 DIAGNOSIS — I25.10 CORONARY ARTERY DISEASE WITHOUT ANGINA PECTORIS, UNSPECIFIED VESSEL OR LESION TYPE, UNSPECIFIED WHETHER NATIVE OR TRANSPLANTED HEART: ICD-10-CM

## 2023-09-01 DIAGNOSIS — R73.09 ELEVATED GLUCOSE: ICD-10-CM

## 2023-09-01 DIAGNOSIS — Z00.00 MEDICARE ANNUAL WELLNESS VISIT, SUBSEQUENT: Primary | ICD-10-CM

## 2023-09-01 DIAGNOSIS — E78.2 MIXED HYPERLIPIDEMIA: ICD-10-CM

## 2023-09-01 DIAGNOSIS — I10 BENIGN ESSENTIAL HYPERTENSION: ICD-10-CM

## 2023-09-01 PROCEDURE — G0439 PPPS, SUBSEQ VISIT: HCPCS | Performed by: INTERNAL MEDICINE

## 2023-09-01 RX ORDER — NITROGLYCERIN 0.4 MG/1
0.4 TABLET SUBLINGUAL EVERY 5 MIN PRN
Qty: 25 TABLET | Refills: 3 | Status: SHIPPED | OUTPATIENT
Start: 2023-09-01

## 2023-09-01 ASSESSMENT — ENCOUNTER SYMPTOMS
CONSTIPATION: 0
WEAKNESS: 0
DIZZINESS: 0
JOINT SWELLING: 0
HEADACHES: 0
FEVER: 0
NERVOUS/ANXIOUS: 0
EYE PAIN: 0
CHILLS: 0
ARTHRALGIAS: 1
SHORTNESS OF BREATH: 0
MYALGIAS: 0
HEARTBURN: 1
HEMATOCHEZIA: 0
ABDOMINAL PAIN: 0
DIARRHEA: 0
PARESTHESIAS: 0
COUGH: 1
NAUSEA: 0
FREQUENCY: 0
DYSURIA: 0
PALPITATIONS: 0
HEMATURIA: 0
SORE THROAT: 0

## 2023-09-01 ASSESSMENT — ACTIVITIES OF DAILY LIVING (ADL): CURRENT_FUNCTION: NO ASSISTANCE NEEDED

## 2023-09-01 ASSESSMENT — PAIN SCALES - GENERAL: PAINLEVEL: NO PAIN (0)

## 2023-09-01 NOTE — PROGRESS NOTES
"UBJECTIVE:   Willie is a 67 year old who presents for Preventive Visit.      Are you in the first 12 months of your Medicare coverage?  No    Healthy Habits:     In general, how would you rate your overall health?  Good    Frequency of exercise:  1 day/week    Duration of exercise:  15-30 minutes    Do you usually eat at least 4 servings of fruit and vegetables a day, include whole grains    & fiber and avoid regularly eating high fat or \"junk\" foods?  No    Taking medications regularly:  Yes    Medication side effects:  None    Ability to successfully perform activities of daily living:  No assistance needed    Home Safety:  No safety concerns identified    Hearing Impairment:  No hearing concerns    In the past 6 months, have you been bothered by leaking of urine?  No    In general, how would you rate your overall mental or emotional health?  Good    Additional concerns today:  No        Have you ever done Advance Care Planning? (For example, a Health Directive, POLST, or a discussion with a medical provider or your loved ones about your wishes): Yes, advance care planning is on file.       Fall risk  Fallen 2 or more times in the past year?: No  Any fall with injury in the past year?: No    Cognitive Screening   1) Repeat 3 items (Leader, Season, Table)    2) Clock draw: NORMAL  3) 3 item recall: Recalls 2 objects   Results: NORMAL clock, 1-2 items recalled: COGNITIVE IMPAIRMENT LESS LIKELY    Mini-CogTM Copyright MARSHA Major. Licensed by the author for use in [facility]; reprinted with permission (ebony@.[facility]). All rights reserved.      Do you have sleep apnea, excessive snoring or daytime drowsiness? : no    Reviewed and updated as needed this visit by clinical staff   Tobacco  Allergies  Meds              Reviewed and updated as needed this visit by Provider                 Social History     Tobacco Use    Smoking status: Former     Packs/day: 0.00     Years: 0.00     Pack years: 0.00     Types: " Cigarettes    Smokeless tobacco: Never   Substance Use Topics    Alcohol use: Yes             8/28/2023     9:21 AM   Alcohol Use   Prescreen: >3 drinks/day or >7 drinks/week? No     Do you have a current opioid prescription? No  Do you use any other controlled substances or medications that are not prescribed by a provider? None              Current providers sharing in care for this patient include:   Patient Care Team:  Devon Amin DO as PCP - General (Internal Medicine)  Devon Amin DO as Assigned PCP  Julita Guzmán MD as Assigned Heart and Vascular Provider    The following health maintenance items are reviewed in Epic and correct as of today:  Health Maintenance   Topic Date Due    Pneumococcal Vaccine: 65+ Years (1 - PCV) Never done    ZOSTER IMMUNIZATION (1 of 2) Never done    LUNG CANCER SCREENING  Never done    COVID-19 Vaccine (6 - Moderna series) 02/17/2023    DTAP/TDAP/TD IMMUNIZATION (3 - Td or Tdap) 07/15/2023    ANNUAL REVIEW OF HM ORDERS  08/25/2023    INFLUENZA VACCINE (1) Never done    MEDICARE ANNUAL WELLNESS VISIT  08/25/2023    FALL RISK ASSESSMENT  09/01/2024    COLORECTAL CANCER SCREENING  08/20/2025    LIPID  07/06/2028    ADVANCE CARE PLANNING  09/01/2028    HEPATITIS C SCREENING  Completed    PHQ-2 (once per calendar year)  Completed    AORTIC ANEURYSM SCREENING (SYSTEM ASSIGNED)  Completed    IPV IMMUNIZATION  Aged Out    HPV IMMUNIZATION  Aged Out    MENINGITIS IMMUNIZATION  Aged Out     Lab work is in process  Labs reviewed in EPIC  BP Readings from Last 3 Encounters:   09/01/23 118/80   08/02/23 112/74   08/25/22 118/74    Wt Readings from Last 3 Encounters:   09/01/23 113.4 kg (250 lb)   08/02/23 115 kg (253 lb 9.6 oz)   08/25/22 114.9 kg (253 lb 6.4 oz)                  Patient Active Problem List   Diagnosis    Coronary atherosclerosis    Encounter for colonoscopy in patient with family history of colon cancer    Mixed hyperlipidemia     Postsurgical percutaneous transluminal coronary angioplasty status    Hyperlipidemia     Past Surgical History:   Procedure Laterality Date    COLONOSCOPY N/A 8/20/2020    Procedure: Colonoscopy;  Surgeon: Ean Gerard MD;  Location:  GI       Social History     Tobacco Use    Smoking status: Former     Packs/day: 0.00     Years: 0.00     Pack years: 0.00     Types: Cigarettes    Smokeless tobacco: Never   Substance Use Topics    Alcohol use: Yes     No family history on file.      Current Outpatient Medications   Medication Sig Dispense Refill    ASPIRIN 81 PO       Cholecalciferol (VITAMIN D3) 50 MCG (2000 UT) CAPS Take 2,000 Units by mouth daily      ezetimibe (ZETIA) 10 MG tablet Take 1 tablet (10 mg) by mouth daily 90 tablet 3    Flaxseed, Linseed, (FLAX SEED OIL) 1000 MG capsule Take 1 capsule by mouth daily Taking 1400mg twice daily      gemfibrozil (LOPID) 600 MG tablet TAKE 1 TABLET TWICE DAILY BEFORE MEALS (NEEDS VISIT FOR FURTHER REFILLS) 180 tablet 2    metoprolol succinate ER (TOPROL XL) 50 MG 24 hr tablet TAKE 1 TABLET DAILY 90 tablet 0    multivitamin w/minerals (THERA-VIT-M) tablet Take by mouth daily      nitroGLYcerin (NITROSTAT) 0.4 MG sublingual tablet Place 1 tablet (0.4 mg) under the tongue every 5 minutes as needed for chest pain For chest pain place 1 tablet under the tongue every 5 minutes for 3 doses. If symptoms persist 5 minutes after 1st dose call 911. 25 tablet 3    Omega-3 Fatty Acids (FISH OIL) 1200 MG capsule Take 1,200 mg by mouth 3 times daily      ramipril (ALTACE) 5 MG capsule TAKE 1 CAPSULE EVERY DAY 90 capsule 2    rosuvastatin (CRESTOR) 40 MG tablet TAKE 1 TABLET DAILY 90 tablet 0     No Known Allergies          Review of Systems   Constitutional:  Negative for chills and fever.   HENT:  Negative for congestion, ear pain, hearing loss and sore throat.    Eyes:  Negative for pain and visual disturbance.   Respiratory:  Positive for cough. Negative for shortness of breath.   "  Cardiovascular:  Negative for chest pain, palpitations and peripheral edema.   Gastrointestinal:  Positive for heartburn. Negative for abdominal pain, constipation, diarrhea, hematochezia and nausea.   Genitourinary:  Negative for dysuria, frequency, genital sores, hematuria, impotence, penile discharge and urgency.   Musculoskeletal:  Positive for arthralgias. Negative for joint swelling and myalgias.   Skin:  Negative for rash.   Neurological:  Negative for dizziness, weakness, headaches and paresthesias.   Psychiatric/Behavioral:  Negative for mood changes. The patient is not nervous/anxious.        Patient has occasional cough which is nonproductive.  Not associate with any hemoptysis.  Somewhat worse in the morning.  Suspect posterior nasal drainage.    Occasional heartburn is noted.  This has improved with use of occasional PPI    Patient has some chronic discomfort in both knees.  Notes that this is worse after he ambulates [place].  Does not require any over-the-counter anti-inflammatory treatment.          OBJECTIVE:   /80 (BP Location: Right arm, Patient Position: Right side, Cuff Size: Adult Large)   Pulse 69   Temp 97.5  F (36.4  C) (Temporal)   Resp 16   Ht 1.816 m (5' 11.5\")   Wt 113.4 kg (250 lb)   SpO2 96%   BMI 34.38 kg/m   Estimated body mass index is 34.38 kg/m  as calculated from the following:    Height as of this encounter: 1.816 m (5' 11.5\").    Weight as of this encounter: 113.4 kg (250 lb).  Physical Exam  GENERAL: healthy, alert and no distress  EYES: Eyes grossly normal to inspection, PERRL and conjunctivae and sclerae normal  HENT: ear canals and TM's normal, nose and mouth without ulcers or lesions  NECK: no adenopathy, no asymmetry, masses, or scars and thyroid normal to palpation  RESP: lungs clear to auscultation - no rales, rhonchi or wheezes  CV: regular rate and rhythm, normal S1 S2, no S3 or S4, no murmur, click or rub, no peripheral edema and " "peripheral pulses strong  ABDOMEN: soft, nontender, no hepatosplenomegaly, no masses and bowel sounds normal  MS: no gross musculoskeletal defects noted, no edema  SKIN: no suspicious lesions or rashes  NEURO: Normal strength and tone, mentation intact and speech normal  PSYCH: mentation appears normal, affect normal/bright        ASSESSMENT / PLAN:       ICD-10-CM    1. Medicare annual wellness visit, subsequent  Z00.00       2. Benign essential hypertension  I10       3. Mixed hyperlipidemia  E78.2       4. Coronary artery disease without angina pectoris, unspecified vessel or lesion type, unspecified whether native or transplanted heart  I25.10 nitroGLYcerin (NITROSTAT) 0.4 MG sublingual tablet      5. Elevated glucose  R73.09 Hemoglobin A1c          Patient has been advised of split billing requirements and indicates understanding: Yes      COUNSELING:  Reviewed preventive health counseling, as reflected in patient instructions       Regular exercise       Healthy diet/nutrition       Vision screening       Hearing screening       Dental care       Bladder control       Colon cancer screening       Prostate cancer screening      BMI:   Estimated body mass index is 34.38 kg/m  as calculated from the following:    Height as of this encounter: 1.816 m (5' 11.5\").    Weight as of this encounter: 113.4 kg (250 lb).   Weight management plan: Discussed healthy diet and exercise guidelines      He reports that he has quit smoking. His smoking use included cigarettes. He has never used smokeless tobacco.      Appropriate preventive services were discussed with this patient, including applicable screening as appropriate for cardiovascular disease, diabetes, osteopenia/osteoporosis, and glaucoma.  As appropriate for age/gender, discussed screening for colorectal cancer, prostate cancer, breast cancer, and cervical cancer. Checklist reviewing preventive services available has been given to the patient.    Reviewed patients " plan of care and provided an AVS. The Basic Care Plan (routine screening as documented in Health Maintenance) for Osei meets the Care Plan requirement. This Care Plan has been established and reviewed with the Patient.          Devon Amin [facility]    Identified Health Risks:  I have reviewed Opioid Use Disorder and Substance Use Disorder risk factors and made any needed referrals.

## 2023-09-07 DIAGNOSIS — E78.2 MIXED HYPERLIPIDEMIA: ICD-10-CM

## 2023-09-08 RX ORDER — ROSUVASTATIN CALCIUM 40 MG/1
TABLET, COATED ORAL
Qty: 90 TABLET | Refills: 2 | Status: SHIPPED | OUTPATIENT
Start: 2023-09-08 | End: 2024-01-08

## 2023-10-12 DIAGNOSIS — I10 BENIGN ESSENTIAL HYPERTENSION: ICD-10-CM

## 2023-10-13 RX ORDER — METOPROLOL SUCCINATE 50 MG/1
TABLET, EXTENDED RELEASE ORAL
Qty: 90 TABLET | Refills: 0 | Status: SHIPPED | OUTPATIENT
Start: 2023-10-13 | End: 2024-01-08

## 2023-10-16 DIAGNOSIS — I10 BENIGN ESSENTIAL HYPERTENSION: ICD-10-CM

## 2023-10-16 RX ORDER — RAMIPRIL 5 MG/1
5 CAPSULE ORAL DAILY
Qty: 90 CAPSULE | Refills: 2 | Status: SHIPPED | OUTPATIENT
Start: 2023-10-16 | End: 2024-01-08

## 2023-11-16 ENCOUNTER — ALLIED HEALTH/NURSE VISIT (OUTPATIENT)
Dept: FAMILY MEDICINE | Facility: CLINIC | Age: 68
End: 2023-11-16
Payer: MEDICARE

## 2023-11-16 ENCOUNTER — LAB (OUTPATIENT)
Dept: LAB | Facility: CLINIC | Age: 68
End: 2023-11-16
Payer: MEDICARE

## 2023-11-16 DIAGNOSIS — Z23 NEED FOR SHINGLES VACCINE: ICD-10-CM

## 2023-11-16 DIAGNOSIS — Z23 ENCOUNTER FOR IMMUNIZATION: Primary | ICD-10-CM

## 2023-11-16 DIAGNOSIS — Z29.11 NEED FOR VACCINATION AGAINST RESPIRATORY SYNCYTIAL VIRUS: ICD-10-CM

## 2023-11-16 DIAGNOSIS — R73.09 ELEVATED GLUCOSE: ICD-10-CM

## 2023-11-16 DIAGNOSIS — Z23 NEED FOR TDAP VACCINATION: ICD-10-CM

## 2023-11-16 LAB — HBA1C MFR BLD: 6.1 %

## 2023-11-16 PROCEDURE — 36415 COLL VENOUS BLD VENIPUNCTURE: CPT

## 2023-11-16 PROCEDURE — 83036 HEMOGLOBIN GLYCOSYLATED A1C: CPT

## 2023-11-16 PROCEDURE — 99207 PR NO CHARGE NURSE ONLY: CPT

## 2023-11-16 PROCEDURE — 91320 SARSCV2 VAC 30MCG TRS-SUC IM: CPT

## 2023-11-16 PROCEDURE — 90480 ADMN SARSCOV2 VAC 1/ONLY CMP: CPT

## 2023-11-16 RX ORDER — RESPIRATORY SYNCYTIAL VIRUS VACCINE 120MCG/0.5
0.5 KIT INTRAMUSCULAR ONCE
Qty: 1 EACH | Refills: 0 | Status: CANCELLED | OUTPATIENT
Start: 2023-11-16 | End: 2023-11-16

## 2023-11-16 NOTE — PROGRESS NOTES
Prior to immunization administration, verified patients identity using patient s name and date of birth. Please see Immunization Activity for additional information.     Screening Questionnaire for Adult Immunization    Are you sick today?   No   Do you have allergies to medications, food, a vaccine component or latex?   No   Have you ever had a serious reaction after receiving a vaccination?   No   Do you have a long-term health problem with heart, lung, kidney, or metabolic disease (e.g., diabetes), asthma, a blood disorder, no spleen, complement component deficiency, a cochlear implant, or a spinal fluid leak?  Are you on long-term aspirin therapy?   Yes   Do you have cancer, leukemia, HIV/AIDS, or any other immune system problem?   No   Do you have a parent, brother, or sister with an immune system problem?   No   In the past 3 months, have you taken medications that affect  your immune system, such as prednisone, other steroids, or anticancer drugs; drugs for the treatment of rheumatoid arthritis, Crohn s disease, or psoriasis; or have you had radiation treatments?   No   Have you had a seizure, or a brain or other nervous system problem?   No   During the past year, have you received a transfusion of blood or blood    products, or been given immune (gamma) globulin or antiviral drug?   No   For women: Are you pregnant or is there a chance you could become       pregnant during the next month?   No   Have you received any vaccinations in the past 4 weeks?   No     Immunization questionnaire was positive for at least one answer.  Notified Regis.    I have reviewed the following standing orders:   This patient is due and qualifies for the Covid-19 vaccine.     Click here for COVID-19 Standing Order    I have reviewed the vaccines inclusion and exclusion criteria; No concerns regarding eligibility.     Patient instructed to remain in clinic for 15 minutes afterwards, and to report any adverse reactions.      Screening performed by Rachna Bailey on 11/16/2023 at 8:59 AM.

## 2024-01-08 DIAGNOSIS — I10 BENIGN ESSENTIAL HYPERTENSION: ICD-10-CM

## 2024-01-08 DIAGNOSIS — E78.2 MIXED HYPERLIPIDEMIA: ICD-10-CM

## 2024-01-08 NOTE — TELEPHONE ENCOUNTER
Patient had a pharmacy change, is needing NEW Rx sent to [facility] in [place].    April Sherwood XRO/

## 2024-01-10 DIAGNOSIS — E78.2 MIXED HYPERLIPIDEMIA: Primary | ICD-10-CM

## 2024-01-10 RX ORDER — RAMIPRIL 5 MG/1
5 CAPSULE ORAL DAILY
Qty: 90 CAPSULE | Refills: 2 | Status: SHIPPED | OUTPATIENT
Start: 2024-01-10 | End: 2024-09-23

## 2024-01-10 RX ORDER — GEMFIBROZIL 600 MG/1
TABLET, FILM COATED ORAL
Qty: 180 TABLET | Refills: 2 | Status: SHIPPED | OUTPATIENT
Start: 2024-01-10

## 2024-01-10 RX ORDER — ROSUVASTATIN CALCIUM 40 MG/1
40 TABLET, COATED ORAL DAILY
Qty: 90 TABLET | Refills: 2 | Status: SHIPPED | OUTPATIENT
Start: 2024-01-10

## 2024-01-10 RX ORDER — EZETIMIBE 10 MG/1
10 TABLET ORAL DAILY
Qty: 90 TABLET | Refills: 3 | Status: SHIPPED | OUTPATIENT
Start: 2024-01-10 | End: 2024-09-05

## 2024-01-10 RX ORDER — METOPROLOL SUCCINATE 50 MG/1
50 TABLET, EXTENDED RELEASE ORAL DAILY
Qty: 90 TABLET | Refills: 0 | Status: SHIPPED | OUTPATIENT
Start: 2024-01-10 | End: 2024-04-11

## 2024-01-10 NOTE — PROGRESS NOTES
Please contact the patient and tell him he needs to stop by the laboratory for some blood work.    Brennan

## 2024-01-11 NOTE — PROGRESS NOTES
Given his elevated cholesterol and the concurrent use of Crestor, Zetia, and gemfibrozil, we need to keep a very close eye on his liver function.  It is recommended that the liver tests and cholesterol be checked every 6 months.  Brennan

## 2024-01-11 NOTE — PROGRESS NOTES
"I  called this patient with the below message from Dr. Amin.    Willie would like it explained in more detail why he has to go from checking his labs 1 time a year to every 6 months.  He stated \" my labs are good , so why check every 6 months?\"  I explained that we need to keep an eye on his liver function.  "

## 2024-04-11 DIAGNOSIS — I10 BENIGN ESSENTIAL HYPERTENSION: ICD-10-CM

## 2024-04-11 RX ORDER — METOPROLOL SUCCINATE 50 MG/1
50 TABLET, EXTENDED RELEASE ORAL DAILY
Qty: 90 TABLET | Refills: 0 | Status: SHIPPED | OUTPATIENT
Start: 2024-04-11 | End: 2024-09-03

## 2024-05-28 DIAGNOSIS — I10 BENIGN ESSENTIAL HYPERTENSION: ICD-10-CM

## 2024-05-29 RX ORDER — METOPROLOL SUCCINATE 50 MG/1
50 TABLET, EXTENDED RELEASE ORAL DAILY
Qty: 90 TABLET | Refills: 0 | OUTPATIENT
Start: 2024-05-29

## 2024-08-27 ENCOUNTER — LAB (OUTPATIENT)
Dept: LAB | Facility: CLINIC | Age: 69
End: 2024-08-27
Payer: MEDICARE

## 2024-08-27 DIAGNOSIS — E78.2 MIXED HYPERLIPIDEMIA: ICD-10-CM

## 2024-08-27 LAB
ALBUMIN SERPL BCG-MCNC: 4.4 G/DL (ref 3.5–5.2)
ALP SERPL-CCNC: 64 U/L (ref 40–150)
ALT SERPL W P-5'-P-CCNC: 36 U/L (ref 0–70)
ANION GAP SERPL CALCULATED.3IONS-SCNC: 13 MMOL/L (ref 7–15)
AST SERPL W P-5'-P-CCNC: 30 U/L (ref 0–45)
BILIRUB SERPL-MCNC: 0.5 MG/DL
BUN SERPL-MCNC: 17.6 MG/DL (ref 8–23)
CALCIUM SERPL-MCNC: 9.1 MG/DL (ref 8.8–10.4)
CHLORIDE SERPL-SCNC: 105 MMOL/L (ref 98–107)
CHOLEST SERPL-MCNC: 138 MG/DL
CREAT SERPL-MCNC: 1.05 MG/DL (ref 0.67–1.17)
EGFRCR SERPLBLD CKD-EPI 2021: 76 ML/MIN/1.73M2
FASTING STATUS PATIENT QL REPORTED: YES
FASTING STATUS PATIENT QL REPORTED: YES
GLUCOSE SERPL-MCNC: 127 MG/DL (ref 70–99)
HCO3 SERPL-SCNC: 20 MMOL/L (ref 22–29)
HDLC SERPL-MCNC: 40 MG/DL
LDLC SERPL CALC-MCNC: 76 MG/DL
NONHDLC SERPL-MCNC: 98 MG/DL
POTASSIUM SERPL-SCNC: 4.1 MMOL/L (ref 3.4–5.3)
PROT SERPL-MCNC: 7.6 G/DL (ref 6.4–8.3)
SODIUM SERPL-SCNC: 138 MMOL/L (ref 135–145)
TRIGL SERPL-MCNC: 111 MG/DL

## 2024-08-27 PROCEDURE — 80061 LIPID PANEL: CPT

## 2024-08-27 PROCEDURE — 80053 COMPREHEN METABOLIC PANEL: CPT

## 2024-08-27 PROCEDURE — 36415 COLL VENOUS BLD VENIPUNCTURE: CPT

## 2024-09-03 ENCOUNTER — OFFICE VISIT (OUTPATIENT)
Dept: INTERNAL MEDICINE | Facility: CLINIC | Age: 69
End: 2024-09-03
Payer: MEDICARE

## 2024-09-03 VITALS
TEMPERATURE: 97.1 F | SYSTOLIC BLOOD PRESSURE: 134 MMHG | HEIGHT: 72 IN | HEART RATE: 60 BPM | OXYGEN SATURATION: 96 % | RESPIRATION RATE: 18 BRPM | DIASTOLIC BLOOD PRESSURE: 84 MMHG | WEIGHT: 259.2 LBS | BODY MASS INDEX: 35.11 KG/M2

## 2024-09-03 DIAGNOSIS — G89.29 CHRONIC PAIN OF RIGHT KNEE: ICD-10-CM

## 2024-09-03 DIAGNOSIS — R73.09 ELEVATED GLUCOSE: ICD-10-CM

## 2024-09-03 DIAGNOSIS — E66.812 CLASS 2 SEVERE OBESITY DUE TO EXCESS CALORIES WITH SERIOUS COMORBIDITY AND BODY MASS INDEX (BMI) OF 35.0 TO 35.9 IN ADULT (H): ICD-10-CM

## 2024-09-03 DIAGNOSIS — I25.10 ATHEROSCLEROSIS OF NATIVE CORONARY ARTERY OF NATIVE HEART WITHOUT ANGINA PECTORIS: ICD-10-CM

## 2024-09-03 DIAGNOSIS — I10 BENIGN ESSENTIAL HYPERTENSION: ICD-10-CM

## 2024-09-03 DIAGNOSIS — E78.2 MIXED HYPERLIPIDEMIA: ICD-10-CM

## 2024-09-03 DIAGNOSIS — Z00.00 MEDICARE ANNUAL WELLNESS VISIT, SUBSEQUENT: Primary | ICD-10-CM

## 2024-09-03 DIAGNOSIS — Z12.5 SCREENING FOR PROSTATE CANCER: ICD-10-CM

## 2024-09-03 DIAGNOSIS — M25.561 CHRONIC PAIN OF RIGHT KNEE: ICD-10-CM

## 2024-09-03 DIAGNOSIS — E66.01 CLASS 2 SEVERE OBESITY DUE TO EXCESS CALORIES WITH SERIOUS COMORBIDITY AND BODY MASS INDEX (BMI) OF 35.0 TO 35.9 IN ADULT (H): ICD-10-CM

## 2024-09-03 LAB
ERYTHROCYTE [DISTWIDTH] IN BLOOD BY AUTOMATED COUNT: 13 % (ref 10–15)
HBA1C MFR BLD: 6.1 %
HCT VFR BLD AUTO: 37.2 % (ref 40–53)
HGB BLD-MCNC: 12.4 G/DL (ref 13.3–17.7)
MCH RBC QN AUTO: 32.5 PG (ref 26.5–33)
MCHC RBC AUTO-ENTMCNC: 33.3 G/DL (ref 31.5–36.5)
MCV RBC AUTO: 97 FL (ref 78–100)
PLATELET # BLD AUTO: 178 10E3/UL (ref 150–450)
PSA SERPL DL<=0.01 NG/ML-MCNC: 0.24 NG/ML (ref 0–6.5)
RBC # BLD AUTO: 3.82 10E6/UL (ref 4.4–5.9)
WBC # BLD AUTO: 4.9 10E3/UL (ref 4–11)

## 2024-09-03 PROCEDURE — 99214 OFFICE O/P EST MOD 30 MIN: CPT | Mod: 25 | Performed by: INTERNAL MEDICINE

## 2024-09-03 PROCEDURE — G0439 PPPS, SUBSEQ VISIT: HCPCS | Performed by: INTERNAL MEDICINE

## 2024-09-03 PROCEDURE — 85027 COMPLETE CBC AUTOMATED: CPT | Performed by: INTERNAL MEDICINE

## 2024-09-03 PROCEDURE — G0103 PSA SCREENING: HCPCS | Performed by: INTERNAL MEDICINE

## 2024-09-03 PROCEDURE — 36415 COLL VENOUS BLD VENIPUNCTURE: CPT | Performed by: INTERNAL MEDICINE

## 2024-09-03 PROCEDURE — 83036 HEMOGLOBIN GLYCOSYLATED A1C: CPT | Performed by: INTERNAL MEDICINE

## 2024-09-03 RX ORDER — METOPROLOL SUCCINATE 50 MG/1
50 TABLET, EXTENDED RELEASE ORAL DAILY
Qty: 90 TABLET | Refills: 0 | Status: SHIPPED | OUTPATIENT
Start: 2024-09-03 | End: 2024-09-24

## 2024-09-03 RX ORDER — MELOXICAM 15 MG/1
15 TABLET ORAL DAILY
Qty: 30 TABLET | Refills: 0 | Status: SHIPPED | OUTPATIENT
Start: 2024-09-03 | End: 2024-09-24

## 2024-09-03 ASSESSMENT — PAIN SCALES - GENERAL: PAINLEVEL: NO PAIN (1)

## 2024-09-03 NOTE — PROGRESS NOTES
"Preventive Care Visit  [facility]  Devon Amin DO, Internal Medicine  Sep 3, 2024      Assessment & Plan     Medicare annual wellness visit, subsequent      Chronic pain of right knee  Start anti-inflammatory on a trial basis.  Kidney function closely  - meloxicam (MOBIC) 15 MG tablet; Take 1 tablet (15 mg) by mouth daily.    Benign essential hypertension  Continue current medications.  Check on blood pressures and report back in a couple weeks  - metoprolol succinate ER (TOPROL XL) 50 MG 24 hr tablet; Take 1 tablet (50 mg) by mouth daily.  - CBC with platelets; Future  - CBC with platelets    Elevated glucose  Check A1c.  Discussed low carbohydrate low sugar diet.  If A1c elevated, will initiate metformin therapy.  - Hemoglobin A1c; Future  - Hemoglobin A1c    Mixed hyperlipidemia  Continue current medication    Atherosclerosis of native coronary artery of native heart without angina pectoris  Stable    Class 2 severe obesity due to excess calories with serious comorbidity and body mass index (BMI) of 35.0 to 35.9 in adult (H)  Recommend weight loss responsible caloric restriction and exercise    Screening for prostate cancer  Screening  - PSA, screen; Future  - PSA, screen    Patient has been advised of split billing requirements and indicates understanding: Yes        BMI  Estimated body mass index is 35.49 kg/m  as calculated from the following:    Height as of this encounter: 1.82 m (5' 11.65\").    Weight as of this encounter: 117.6 kg (259 lb 3.2 oz).   Weight management plan: Discussed healthy diet and exercise guidelines    Counseling  Appropriate preventive services were addressed with this patient via screening, questionnaire, or discussion as appropriate for fall prevention, nutrition, physical activity, Tobacco-use cessation, social engagement, weight loss and cognition.  Checklist reviewing preventive services available has been given to the " patient.  Reviewed patient's diet, addressing concerns and/or questions.   He is at risk for lack of exercise and has been provided with information to increase physical activity for the benefit of his well-being.   The patient was instructed to see the dentist every 6 months.   The patient reports drinking more than 3 alcoholic drinks per day and/or more than 7 drhnks per week. The patient was counseled and given information about possible harmful effects of excessive alcohol intake.    MEDICATIONS:  Continue current medications without change  Work on weight loss  Regular exercise    Subjective   Willie is a 68 year old, presenting for the following:  Physical        9/3/2024     9:16 AM   Additional Questions   Roomed by [facility] Care Directive  Patient does not have a Health Care Directive or Living Will: Discussed advance care planning with patient; however, patient declined at this time.    HPI              8/29/2024   General Health   How would you rate your overall physical health? Good   Feel stress (tense, anxious, or unable to sleep) Not at all            8/29/2024   Nutrition   Diet: Regular (no restrictions)            8/29/2024   Exercise   Days per week of moderate/strenous exercise 2 days   Average minutes spent exercising at this level 20 min      (!) EXERCISE CONCERN      8/29/2024   Social Factors   Frequency of gathering with friends or relatives Once a week   Worry food won't last until get money to buy more No   Food not last or not have enough money for food? No   Do you have housing? (Housing is defined as stable permanent housing and does not include staying ouside in a car, in a tent, in an abandoned building, in an overnight shelter, or couch-surfing.) Yes   Are you worried about losing your housing? No   Lack of transportation? No   Unable to get utilities (heat,electricity)? No            8/29/2024   Fall Risk   Fallen 2 or more times in the past year? No   Trouble with  walking or balance? No             8/29/2024   Activities of Daily Living- Home Safety   Needs help with the following daily activites None of the above   Safety concerns in the home None of the above            8/29/2024   Dental   Dentist two times every year? (!) NO            8/29/2024   Hearing Screening   Hearing concerns? None of the above            8/29/2024   Driving Risk Screening   Patient/family members have concerns about driving No            8/29/2024   General Alertness/Fatigue Screening   Have you been more tired than usual lately? No            8/29/2024   Urinary Incontinence Screening   Bothered by leaking urine in past 6 months No            8/29/2024   TB Screening   Were you born outside of the [place]? No            Today's PHQ-2 Score:       9/2/2024     9:42 AM   PHQ-2 ( 1999 [employer])   Q1: Little interest or pleasure in doing things 0   Q2: Feeling down, depressed or hopeless 0   PHQ-2 Score 0   Q1: Little interest or pleasure in doing things Not at all   Q2: Feeling down, depressed or hopeless Not at all   PHQ-2 Score 0           8/29/2024   Substance Use   Alcohol more than 3/day or more than 7/wk Yes   How often do you have a drink containing alcohol 4 or more times a week   How many alcohol drinks on typical day 1 or 2   How often do you have 5+ drinks at one occasion Never   Audit 2/3 Score 0   How often not able to stop drinking once started Never   How often failed to do what normally expected Never   How often needed first drink in am after a heavy drinking session Never   How often feeling of guilt or remorse after drinking Never   How often unable to remember what happened the night before Never   Have you or someone else been injured because of your drinking No   Has anyone been concerned or suggested you cut down on drinking No   TOTAL SCORE - AUDIT 4   Do you have a current opioid prescription? No   How severe/bad is pain from 1 to 10? 0/10 (No Pain)   Do you use any other  substances recreationally? No        Social History     Tobacco Use    Smoking status: Former     Current packs/day: 0.00     Types: Cigarettes    Smokeless tobacco: Never   Vaping Use    Vaping status: Never Used   Substance Use Topics    Alcohol use: Yes    Drug use: Never       ASCVD Risk   The 10-year ASCVD risk score (Todd SANTACRUZ, et al., 2019) is: 20.7%    Values used to calculate the score:      Age: 68 years      Sex: Male      Is Non- : No      Diabetic: No      Tobacco smoker: No      Systolic Blood Pressure: 134 mmHg      Is BP treated: Yes      HDL Cholesterol: 40 mg/dL      Total Cholesterol: 138 mg/dL            Reviewed and updated as needed this visit by Provider                    Past Medical History:   Diagnosis Date    Heart disease      Past Surgical History:   Procedure Laterality Date    CARDIAC SURGERY      COLONOSCOPY N/A 08/20/2020    Procedure: Colonoscopy;  Surgeon: Ean Gerard MD;  Location:  GI     Lab work is in process  Labs reviewed in EPIC  BP Readings from Last 3 Encounters:   09/03/24 134/84   09/01/23 118/80   08/02/23 112/74    Wt Readings from Last 3 Encounters:   09/03/24 117.6 kg (259 lb 3.2 oz)   09/01/23 113.4 kg (250 lb)   08/02/23 115 kg (253 lb 9.6 oz)                  Patient Active Problem List   Diagnosis    Coronary atherosclerosis    Encounter for colonoscopy in patient with family history of colon cancer    Mixed hyperlipidemia    Postsurgical percutaneous transluminal coronary angioplasty status    Hyperlipidemia    Class 2 severe obesity due to excess calories with serious comorbidity in adult (H)     Past Surgical History:   Procedure Laterality Date    CARDIAC SURGERY      COLONOSCOPY N/A 08/20/2020    Procedure: Colonoscopy;  Surgeon: Ean Gerard MD;  Location:  GI       Social History     Tobacco Use    Smoking status: Former     Current packs/day: 0.00     Types: Cigarettes    Smokeless tobacco: Never   Substance Use  Topics    Alcohol use: Yes     No family history on file.      Current Outpatient Medications   Medication Sig Dispense Refill    ASPIRIN 81 PO       Cholecalciferol (VITAMIN D3) 50 MCG (2000 UT) CAPS Take 2,000 Units by mouth daily      Flaxseed, Linseed, (FLAX SEED OIL) 1000 MG capsule Take 1 capsule by mouth daily Taking 1400mg twice daily      gemfibrozil (LOPID) 600 MG tablet TAKE 1 TABLET TWICE DAILY BEFORE MEALS (NEEDS VISIT FOR FURTHER REFILLS) 180 tablet 2    meloxicam (MOBIC) 15 MG tablet Take 1 tablet (15 mg) by mouth daily. 30 tablet 0    metoprolol succinate ER (TOPROL XL) 50 MG 24 hr tablet Take 1 tablet (50 mg) by mouth daily. 90 tablet 0    multivitamin w/minerals (THERA-VIT-M) tablet Take by mouth daily      nitroGLYcerin (NITROSTAT) 0.4 MG sublingual tablet Place 1 tablet (0.4 mg) under the tongue every 5 minutes as needed for chest pain For chest pain place 1 tablet under the tongue every 5 minutes for 3 doses. If symptoms persist 5 minutes after 1st dose call 911. 25 tablet 3    Omega-3 Fatty Acids (FISH OIL) 1200 MG capsule Take 1,200 mg by mouth 3 times daily      ramipril (ALTACE) 5 MG capsule Take 1 capsule (5 mg) by mouth daily 90 capsule 2    rosuvastatin (CRESTOR) 40 MG tablet Take 1 tablet (40 mg) by mouth daily 90 tablet 2    ezetimibe (ZETIA) 10 MG tablet Take 1 tablet (10 mg) by mouth daily (Patient not taking: Reported on 9/3/2024) 90 tablet 3     No Known Allergies  Current providers sharing in care for this patient include:  Patient Care Team:  Devon Amin DO as PCP - General (Internal Medicine)  Devon Amin DO as Assigned PCP  Julita Guzmán MD as Assigned Heart and Vascular Provider    The following health maintenance items are reviewed in Epic and correct as of today:  Health Maintenance   Topic Date Due    ZOSTER IMMUNIZATION (1 of 2) Never done    LUNG CANCER SCREENING  Never done    RSV VACCINE (1 - 1-dose 60+ series) Never done     "Pneumococcal Vaccine: 65+ Years (1 of 1 - PCV) Never done    DTAP/TDAP/TD IMMUNIZATION (3 - Td or Tdap) 07/15/2023    ANNUAL REVIEW OF HM ORDERS  09/01/2024    INFLUENZA VACCINE (1) Never done    COVID-19 Vaccine (7 - 2023-24 season) 09/01/2024    MEDICARE ANNUAL WELLNESS VISIT  09/01/2024    COLORECTAL CANCER SCREENING  08/20/2025    LIPID  08/27/2025    FALL RISK ASSESSMENT  09/03/2025    GLUCOSE  08/27/2027    ADVANCE CARE PLANNING  09/01/2028    HEPATITIS C SCREENING  Completed    PHQ-2 (once per calendar year)  Completed    HPV IMMUNIZATION  Aged Out    MENINGITIS IMMUNIZATION  Aged Out    RSV MONOCLONAL ANTIBODY  Aged Out         Review of Systems  CONSTITUTIONAL: NEGATIVE for fever, chills, change in weight  INTEGUMENTARY/SKIN: NEGATIVE for worrisome rashes, moles or lesions  EYES: NEGATIVE for vision changes or irritation  ENT/MOUTH: NEGATIVE for ear, mouth and throat problems  RESP: NEGATIVE for significant cough or SOB  BREAST: NEGATIVE for masses, tenderness or discharge  CV: NEGATIVE for chest pain, palpitations or peripheral edema  GI: NEGATIVE for nausea, abdominal pain, heartburn, or change in bowel habits  : NEGATIVE for frequency, dysuria, or hematuria  MUSCULOSKELETAL: Positive for chronic right knee discomfort.  Worse when he walks downstairs.  Does have some discomfort over the patellar tendon as well.  NEURO: NEGATIVE for weakness, dizziness or paresthesias  ENDOCRINE: NEGATIVE for temperature intolerance, skin/hair changes  HEME: NEGATIVE for bleeding problems  PSYCHIATRIC: NEGATIVE for changes in mood or affect     Objective    Exam  /84 (BP Location: Right arm, Patient Position: Sitting, Cuff Size: Adult Large)   Pulse 60   Temp 97.1  F (36.2  C) (Temporal)   Resp 18   Ht 1.82 m (5' 11.65\")   Wt 117.6 kg (259 lb 3.2 oz)   SpO2 96%   BMI 35.49 kg/m     Estimated body mass index is 35.49 kg/m  as calculated from the following:    Height as of this encounter: 1.82 m (5' 11.65\").   "  Weight as of this encounter: 117.6 kg (259 lb 3.2 oz).    Physical Exam  GENERAL: alert and no distress  EYES: Eyes grossly normal to inspection, PERRL and conjunctivae and sclerae normal  HENT: ear canals and TM's normal, nose and mouth without ulcers or lesions  NECK: no adenopathy, no asymmetry, masses, or scars  RESP: lungs clear to auscultation - no rales, rhonchi or wheezes  CV: regular rate and rhythm, normal S1 S2, no S3 or S4, no murmur, click or rub, no peripheral edema  ABDOMEN: soft, nontender, no hepatosplenomegaly, no masses and bowel sounds normal  MS: Evidence of bilateral arthritis in the knees noted.  Tenderness with palpation to the lateral ligaments and laterally on the right knee.  No evidence of laxity or tear noted.  SKIN: no suspicious lesions or rashes  NEURO: Normal strength and tone, mentation intact and speech normal  PSYCH: mentation appears normal, affect normal/bright        9/3/2024   Mini Cog   Clock Draw Score 2 Normal   3 Item Recall 3 objects recalled   Mini Cog Total Score 5               Prior to immunization administration, verified patients identity using patient s name and date of birth. Please see Immunization Activity for additional information.     Screening Questionnaire for Adult Immunization    Are you sick today?   No   Do you have allergies to medications, food, a vaccine component or latex?   No   Have you ever had a serious reaction after receiving a vaccination?   No   Do you have a long-term health problem with heart, lung, kidney, or metabolic disease (e.g., diabetes), asthma, a blood disorder, no spleen, complement component deficiency, a cochlear implant, or a spinal fluid leak?  Are you on long-term aspirin therapy?   Yes   Do you have cancer, leukemia, HIV/AIDS, or any other immune system problem?   No   Do you have a parent, brother, or sister with an immune system problem?   No   In the past 3 months, have you taken medications that affect  your immune  system, such as prednisone, other steroids, or anticancer drugs; drugs for the treatment of rheumatoid arthritis, Crohn s disease, or psoriasis; or have you had radiation treatments?   No   Have you had a seizure, or a brain or other nervous system problem?   No   During the past year, have you received a transfusion of blood or blood    products, or been given immune (gamma) globulin or antiviral drug?   No   For women: Are you pregnant or is there a chance you could become       pregnant during the next month?   No   Have you received any vaccinations in the past 4 weeks?   No     Immunization questionnaire was positive for at least one answer.  Notified Provider.      Patient instructed to remain in clinic for 15 minutes afterwards, and to report any adverse reactions.     Screening performed by Priti Perez on 9/3/2024 at 9:24 AM.      I have reviewed the patient's vaccination schedule and discussed the benefits of prophylactic vaccination in detail.  I recommend the patient contact their pharmacist for vaccinations.  Discussed that most insurance companies now favor reimbursement to the pharmacies and it will financially behoove the patient to have vaccinations performed at their pharmacy.         Signed Electronically by: Devon Amin DO

## 2024-09-05 ENCOUNTER — OFFICE VISIT (OUTPATIENT)
Dept: CARDIOLOGY | Facility: CLINIC | Age: 69
End: 2024-09-05
Payer: MEDICARE

## 2024-09-05 VITALS
RESPIRATION RATE: 18 BRPM | OXYGEN SATURATION: 95 % | HEART RATE: 67 BPM | BODY MASS INDEX: 35.88 KG/M2 | DIASTOLIC BLOOD PRESSURE: 80 MMHG | WEIGHT: 262 LBS | SYSTOLIC BLOOD PRESSURE: 116 MMHG

## 2024-09-05 DIAGNOSIS — I25.10 CORONARY ARTERY DISEASE WITHOUT ANGINA PECTORIS, UNSPECIFIED VESSEL OR LESION TYPE, UNSPECIFIED WHETHER NATIVE OR TRANSPLANTED HEART: Primary | ICD-10-CM

## 2024-09-05 DIAGNOSIS — I10 BENIGN ESSENTIAL HYPERTENSION: ICD-10-CM

## 2024-09-05 DIAGNOSIS — R73.03 PREDIABETES: ICD-10-CM

## 2024-09-05 DIAGNOSIS — I71.40 ABDOMINAL AORTIC ANEURYSM (AAA) WITHOUT RUPTURE, UNSPECIFIED PART (H): ICD-10-CM

## 2024-09-05 DIAGNOSIS — E78.2 MIXED HYPERLIPIDEMIA: ICD-10-CM

## 2024-09-05 PROCEDURE — 99214 OFFICE O/P EST MOD 30 MIN: CPT | Performed by: INTERNAL MEDICINE

## 2024-09-05 RX ORDER — EZETIMIBE 10 MG/1
10 TABLET ORAL DAILY
COMMUNITY

## 2024-09-05 ASSESSMENT — PAIN SCALES - GENERAL: PAINLEVEL: NO PAIN (0)

## 2024-09-05 NOTE — LETTER
9/5/2024    Devon Amin, DO  [address] [place] [address]    RE: Osei Jarrett       Dear Colleague,     I had the pleasure of seeing Osei aJrrett in the [facility].    General Cardiology Clinic Progress Note  Osei Jarrett MRN# [id number]   YOB: 1955 Age: 68 year old       Reason for visit: Coronary artery disease and cardiac risk factors    History of presenting illness:    Mr. Osei Jarrett is a pleasant 68-year-old male with past medical history significant for coronary artery disease, s/p multiple PCIs including PCI of the mid LAD (on 3/09/2001) with thrombectomy and PCI of the proximal LCX with kissing balloon to OM (on 6/03/2008) for acute inferior MI complicated by out of hospital cardiac arrest and most recent PCI with MARIANN x1 to the mid RCA (5/29/2019), hypertension, hyperlipidemia, diabetes mellitus (diet controlled), history of tobacco use (quit in 2008) . He was last seen by me in August 2022 and presents today for follow up.      He is doing very well now with no concerning cardiac symptoms.  He denies chest discomfort, shortness of breath, palpitations and lightheadedness.    Previously, he was switched from atorvastatin to rosuvastatin 40 mg daily plus Zetia and his LDL is improved, but remains slightly above goal at 76.  His HDL is 40.  Triglycerides are 111.  He remains on gemfibrozil due to a history of hyperlipidemia in the past.    His basic metabolic panel is normal.  His hemoglobin A1c is stable at 6.1, in the prediabetes range.  Liver function test is normal.    He had an ultrasound of the abdominal aorta for screening purposes in 2021 which demonstrated a small abdominal aortic aneurysm, 3. 4 centimeters above the renal arteries and 3.6 cm below.    On examination today his blood pressure is 116/80, heart rate 67, and weight 118 pounds.  His lungs are clear.  Heart rhythm is regular.  His no cardiac murmurs.  No carotid bruits.             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  He is now doing well without any concerning cardiac symptoms.    His prediabetes has not progressed and I urged him to maintain a healthy weight and active lifestyle with a good diet.  His cholesterol numbers are acceptable although not quite optimal but he is on maximal oral medical therapy.  His blood pressures look great.  I will not make any medication changes.    He did have an ultrasound in 2021 showing a small abdominal aortic aneurysm.  I will repeat that ultrasound this year to reevaluate that issue.    Will plan to see him back again in 1 year for reevaluation.    Jonathan Schneider MD           Orders this Visit:  Orders Placed This Encounter   Procedures     US Aorta/Ivc/Iliac Duplex Complete     Follow-Up with Cardiology     Orders Placed This Encounter   Medications     ezetimibe (ZETIA) 10 MG tablet     Sig: Take 10 mg by mouth daily.     Medications Discontinued During This Encounter   Medication Reason     ezetimibe (ZETIA) 10 MG tablet Med Rec(No AVS / No eCancel)       Today's clinic visit entailed:    35 minutes spent by me on the date of the encounter doing chart review, history and exam, documentation and further activities per the note  Provider  Link to [facility] Help Grid     The level of medical decision making during this visit was of high complexity.           Review of Systems:     Review of Systems:  Skin:        Eyes:       ENT:       Respiratory:  Positive for dyspnea on exertion;purulent expectorant  Cardiovascular:  Negative;palpitations;chest pain;edema;syncope or near-syncope;exercise  intolerance;fatigue;cyanosis;dizziness;lightheadedness Positive for  Gastroenterology:      Genitourinary:       Musculoskeletal:       Neurologic:       Psychiatric:       Heme/Lymph/Imm:       Endocrine:                 Physical Exam:     Vitals: /80   Pulse 67   Resp 18   Wt 118.8 kg (262 lb)   SpO2 95%   BMI 35.88 kg/m    Constitutional: Well nourished and in no apparent distress.  Eyes: Pupils equal, round. Sclerae anicteric.   HEENT: Normocephalic, atraumatic.   Neck: Supple. JVD   Respiratory: Breathing non-labored. Lungs clear to auscultation bilaterally. No crackles, wheezes, rhonchi, or rales.  Cardiovascular:  Regular rate and rhythm, normal S1 and S2. No murmur, rub, or gallop.  Skin: Warm, dry. No rashes, cyanosis, or xanthelasma.  Extremities: No edema.  Neurologic: No gross motor deficits. Alert, awake, and oriented to person, place and time.  Psychiatric: Affect appropriate.             Medications:     Current Outpatient Medications   Medication Sig Dispense Refill     ASPIRIN 81 PO        Cholecalciferol (VITAMIN D3) 50 MCG (2000 UT) CAPS Take 2,000 Units by mouth daily       ezetimibe (ZETIA) 10 MG tablet Take 10 mg by mouth daily.       Flaxseed, Linseed, (FLAX SEED OIL) 1000 MG capsule Take 1 capsule by mouth daily Taking 1400mg twice daily       gemfibrozil (LOPID) 600 MG tablet TAKE 1 TABLET TWICE DAILY BEFORE MEALS (NEEDS VISIT FOR FURTHER REFILLS) 180 tablet 2     meloxicam (MOBIC) 15 MG tablet Take 1 tablet (15 mg) by mouth daily. 30 tablet 0     metoprolol succinate ER (TOPROL XL) 50 MG 24 hr tablet Take 1 tablet (50 mg) by mouth daily. 90 tablet 0     multivitamin w/minerals (THERA-VIT-M) tablet Take by mouth daily       Omega-3 Fatty Acids (FISH OIL) 1200 MG capsule Take 1,200 mg by mouth 3 times daily       ramipril (ALTACE) 5 MG capsule Take 1 capsule (5 mg) by mouth daily 90 capsule 2     rosuvastatin (CRESTOR) 40 MG tablet Take 1 tablet (40 mg) by mouth daily 90 tablet 2      nitroGLYcerin (NITROSTAT) 0.4 MG sublingual tablet Place 1 tablet (0.4 mg) under the tongue every 5 minutes as needed for chest pain For chest pain place 1 tablet under the tongue every 5 minutes for 3 doses. If symptoms persist 5 minutes after 1st dose call 911. (Patient not taking: Reported on 9/5/2024) 25 tablet 3       No family history on file.    Social History     Socioeconomic History     Marital status:      Spouse name: Not on file     Number of children: Not on file     Years of education: Not on file     Highest education level: Not on file   Occupational History     Not on file   Tobacco Use     Smoking status: Former     Current packs/day: 0.00     Types: Cigarettes     Smokeless tobacco: Never   Vaping Use     Vaping status: Never Used   Substance and Sexual Activity     Alcohol use: Yes     Drug use: Never     Sexual activity: Yes     Partners: Female   Other Topics Concern     Parent/sibling w/ CABG, MI or angioplasty before 65F 55M? No   Social History Narrative     Not on file     Social Determinants of Health     Financial Resource Strain: Low Risk  (8/29/2024)    Financial Resource Strain      Within the past 12 months, have you or your family members you live with been unable to get utilities (heat, electricity) when it was really needed?: No   Food Insecurity: Low Risk  (8/29/2024)    Food Insecurity      Within the past 12 months, did you worry that your food would run out before you got money to buy more?: No      Within the past 12 months, did the food you bought just not last and you didn t have money to get more?: No   Transportation Needs: Low Risk  (8/29/2024)    Transportation Needs      Within the past 12 months, has lack of transportation kept you from medical appointments, getting your medicines, non-medical meetings or appointments, work, or from getting things that you need?: No   Physical Activity: Insufficiently Active (8/29/2024)    Exercise Vital Sign      Days of  Exercise per Week: 2 days      Minutes of Exercise per Session: 20 min   Stress: No Stress Concern Present (8/29/2024)    [employer] - Occupational Stress Questionnaire      Feeling of Stress : Not at all   Social Connections: Unknown (8/29/2024)    Social Connection and Isolation Panel [NHANES]      Frequency of Communication with Friends and Family: Not on file      Frequency of Social Gatherings with Friends and Family: Once a week      Attends Latter-day Services: Not on file      Active Member of Clubs or Organizations: Not on file      Attends Club or Organization Meetings: Not on file      Marital Status: Not on file   Interpersonal Safety: Low Risk  (9/3/2024)    Interpersonal Safety      Do you feel physically and emotionally safe where you currently live?: Yes      Within the past 12 months, have you been hit, slapped, kicked or otherwise physically hurt by someone?: No      Within the past 12 months, have you been humiliated or emotionally abused in other ways by your partner or ex-partner?: No   Housing Stability: Low Risk  (8/29/2024)    Housing Stability      Do you have housing? : Yes      Are you worried about losing your housing?: No            Past Medical History:     Past Medical History:   Diagnosis Date     Heart disease               Past Surgical History:     Past Surgical History:   Procedure Laterality Date     CARDIAC SURGERY       COLONOSCOPY N/A 08/20/2020    Procedure: Colonoscopy;  Surgeon: Ean Gerard MD;  Location:  GI              Allergies:   Patient has no known allergies.       Data:   All laboratory data reviewed:    Recent Labs   Lab Test 08/27/24  0852 07/06/23  0900 08/04/22  0757   LDL 76 72 59   HDL 40 46 41   NHDL 98 100 86   CHOL 138 146 127   TRIG 111 141 137       Lab Results   Component Value Date    WBC 4.9 09/03/2024    RBC 3.82 (L) 09/03/2024    HGB 12.4 (L) 09/03/2024    HGB 11.8 (L) 06/04/2008    HCT 37.2 (L) 09/03/2024    HCT  "34.9 (L) 06/04/2008    MCV 97 09/03/2024    MCH 32.5 09/03/2024    MCHC 33.3 09/03/2024    RDW 13.0 09/03/2024     09/03/2024     06/04/2008       Lab Results   Component Value Date     08/27/2024     08/03/2020    POTASSIUM 4.1 08/27/2024    POTASSIUM 4.3 08/04/2022    POTASSIUM 4.6 08/03/2020    CHLORIDE 105 08/27/2024    CHLORIDE 107 08/04/2022    CHLORIDE 112 (H) 08/03/2020    CO2 20 (L) 08/27/2024    CO2 28 08/04/2022    CO2 27 08/03/2020    ANIONGAP 13 08/27/2024    ANIONGAP 1 (L) 08/04/2022    ANIONGAP 3 08/03/2020     (H) 08/27/2024     (H) 08/04/2022     (H) 08/03/2020    BUN 17.6 08/27/2024    BUN 17 08/04/2022    BUN 21 08/03/2020    CR 1.05 08/27/2024    CR 1.03 08/03/2020    GFRESTIMATED 76 08/27/2024    GFRESTIMATED 75 08/03/2020    GFRESTBLACK 87 08/03/2020    RICHAR 9.1 08/27/2024    RICHAR 9.5 08/03/2020      Lab Results   Component Value Date    AST 30 08/27/2024    AST 28 08/03/2020    ALT 36 08/27/2024    ALT 49 08/03/2020       Lab Results   Component Value Date    A1C 6.1 (H) 09/03/2024       No results found for: \"INR\"      SAJAN GARDNER MD  [facility]      Thank you for allowing me to participate in the care of your patient.      Sincerely,     SAJAN GARDNER MD     [facility]  cc:   Devon Amin, DO  [address] [place] [address]      "

## 2024-09-05 NOTE — PROGRESS NOTES
General Cardiology Clinic Progress Note  Osei Jarrett MRN# [id number]   YOB: 1955 Age: 68 year old       Reason for visit: Coronary artery disease and cardiac risk factors    History of presenting illness:    Mr. Osei Jarrett is a pleasant 68-year-old male with past medical history significant for coronary artery disease, s/p multiple PCIs including PCI of the mid LAD (on 3/09/2001) with thrombectomy and PCI of the proximal LCX with kissing balloon to OM (on 6/03/2008) for acute inferior MI complicated by out of hospital cardiac arrest and most recent PCI with MARIANN x1 to the mid RCA (5/29/2019), hypertension, hyperlipidemia, diabetes mellitus (diet controlled), history of tobacco use (quit in 2008) . He was last seen by me in August 2022 and presents today for follow up.      He is doing very well now with no concerning cardiac symptoms.  He denies chest discomfort, shortness of breath, palpitations and lightheadedness.    Previously, he was switched from atorvastatin to rosuvastatin 40 mg daily plus Zetia and his LDL is improved, but remains slightly above goal at 76.  His HDL is 40.  Triglycerides are 111.  He remains on gemfibrozil due to a history of hyperlipidemia in the past.    His basic metabolic panel is normal.  His hemoglobin A1c is stable at 6.1, in the prediabetes range.  Liver function test is normal.    He had an ultrasound of the abdominal aorta for screening purposes in 2021 which demonstrated a small abdominal aortic aneurysm, 3. 4 centimeters above the renal arteries and 3.6 cm below.    On examination today his blood pressure is 116/80, heart rate 67, and weight 118 pounds.  His lungs are clear.  Heart rhythm is regular.  His no cardiac murmurs.  No carotid bruits.            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  He is now doing well without any concerning cardiac symptoms.    His prediabetes has not progressed and I urged him to maintain a healthy weight and active lifestyle with a good diet.  His cholesterol numbers are acceptable although not quite optimal but he is on maximal oral medical therapy.  His blood pressures look great.  I will not make any medication changes.    He did have an ultrasound in 2021 showing a small abdominal aortic aneurysm.  I will repeat that ultrasound this year to reevaluate that issue.    Will plan to see him back again in 1 year for reevaluation.    Jonathan Schneider MD           Orders this Visit:  Orders Placed This Encounter   Procedures    US Aorta/Ivc/Iliac Duplex Complete    Follow-Up with Cardiology     Orders Placed This Encounter   Medications    ezetimibe (ZETIA) 10 MG tablet     Sig: Take 10 mg by mouth daily.     Medications Discontinued During This Encounter   Medication Reason    ezetimibe (ZETIA) 10 MG tablet Med Rec(No AVS / No eCancel)       Today's clinic visit entailed:    35 minutes spent by me on the date of the encounter doing chart review, history and exam, documentation and further activities per the note  Provider  Link to [facility] Help Grid     The level of medical decision making during this visit was of high complexity.           Review of Systems:     Review of Systems:  Skin:        Eyes:       ENT:       Respiratory:  Positive for dyspnea on exertion;purulent expectorant  Cardiovascular:  Negative;palpitations;chest pain;edema;syncope or near-syncope;exercise intolerance;fatigue;cyanosis;dizziness;lightheadedness Positive for  Gastroenterology:      Genitourinary:       Musculoskeletal:       Neurologic:       Psychiatric:       Heme/Lymph/Imm:       Endocrine:                 Physical Exam:     Vitals: /80    Pulse 67   Resp 18   Wt 118.8 kg (262 lb)   SpO2 95%   BMI 35.88 kg/m    Constitutional: Well nourished and in no apparent distress.  Eyes: Pupils equal, round. Sclerae anicteric.   HEENT: Normocephalic, atraumatic.   Neck: Supple. JVD   Respiratory: Breathing non-labored. Lungs clear to auscultation bilaterally. No crackles, wheezes, rhonchi, or rales.  Cardiovascular:  Regular rate and rhythm, normal S1 and S2. No murmur, rub, or gallop.  Skin: Warm, dry. No rashes, cyanosis, or xanthelasma.  Extremities: No edema.  Neurologic: No gross motor deficits. Alert, awake, and oriented to person, place and time.  Psychiatric: Affect appropriate.             Medications:     Current Outpatient Medications   Medication Sig Dispense Refill    ASPIRIN 81 PO       Cholecalciferol (VITAMIN D3) 50 MCG (2000 UT) CAPS Take 2,000 Units by mouth daily      ezetimibe (ZETIA) 10 MG tablet Take 10 mg by mouth daily.      Flaxseed, Linseed, (FLAX SEED OIL) 1000 MG capsule Take 1 capsule by mouth daily Taking 1400mg twice daily      gemfibrozil (LOPID) 600 MG tablet TAKE 1 TABLET TWICE DAILY BEFORE MEALS (NEEDS VISIT FOR FURTHER REFILLS) 180 tablet 2    meloxicam (MOBIC) 15 MG tablet Take 1 tablet (15 mg) by mouth daily. 30 tablet 0    metoprolol succinate ER (TOPROL XL) 50 MG 24 hr tablet Take 1 tablet (50 mg) by mouth daily. 90 tablet 0    multivitamin w/minerals (THERA-VIT-M) tablet Take by mouth daily      Omega-3 Fatty Acids (FISH OIL) 1200 MG capsule Take 1,200 mg by mouth 3 times daily      ramipril (ALTACE) 5 MG capsule Take 1 capsule (5 mg) by mouth daily 90 capsule 2    rosuvastatin (CRESTOR) 40 MG tablet Take 1 tablet (40 mg) by mouth daily 90 tablet 2    nitroGLYcerin (NITROSTAT) 0.4 MG sublingual tablet Place 1 tablet (0.4 mg) under the tongue every 5 minutes as needed for chest pain For chest pain place 1 tablet under the tongue every 5 minutes for 3 doses. If symptoms persist 5 minutes after 1st dose call 911.  (Patient not taking: Reported on 9/5/2024) 25 tablet 3       No family history on file.    Social History     Socioeconomic History    Marital status:      Spouse name: Not on file    Number of children: Not on file    Years of education: Not on file    Highest education level: Not on file   Occupational History    Not on file   Tobacco Use    Smoking status: Former     Current packs/day: 0.00     Types: Cigarettes    Smokeless tobacco: Never   Vaping Use    Vaping status: Never Used   Substance and Sexual Activity    Alcohol use: Yes    Drug use: Never    Sexual activity: Yes     Partners: Female   Other Topics Concern    Parent/sibling w/ CABG, MI or angioplasty before 65F 55M? No   Social History Narrative    Not on file     Social Determinants of Health     Financial Resource Strain: Low Risk  (8/29/2024)    Financial Resource Strain     Within the past 12 months, have you or your family members you live with been unable to get utilities (heat, electricity) when it was really needed?: No   Food Insecurity: Low Risk  (8/29/2024)    Food Insecurity     Within the past 12 months, did you worry that your food would run out before you got money to buy more?: No     Within the past 12 months, did the food you bought just not last and you didn t have money to get more?: No   Transportation Needs: Low Risk  (8/29/2024)    Transportation Needs     Within the past 12 months, has lack of transportation kept you from medical appointments, getting your medicines, non-medical meetings or appointments, work, or from getting things that you need?: No   Physical Activity: Insufficiently Active (8/29/2024)    Exercise Vital Sign     Days of Exercise per Week: 2 days     Minutes of Exercise per Session: 20 min   Stress: No Stress Concern Present (8/29/2024)    [employer] - Occupational Stress Questionnaire     Feeling of Stress : Not at all   Social Connections: Unknown (8/29/2024)    Social  Connection and Isolation Panel [NHANES]     Frequency of Communication with Friends and Family: Not on file     Frequency of Social Gatherings with Friends and Family: Once a week     Attends Yarsanism Services: Not on file     Active Member of Clubs or Organizations: Not on file     Attends Club or Organization Meetings: Not on file     Marital Status: Not on file   Interpersonal Safety: Low Risk  (9/3/2024)    Interpersonal Safety     Do you feel physically and emotionally safe where you currently live?: Yes     Within the past 12 months, have you been hit, slapped, kicked or otherwise physically hurt by someone?: No     Within the past 12 months, have you been humiliated or emotionally abused in other ways by your partner or ex-partner?: No   Housing Stability: Low Risk  (8/29/2024)    Housing Stability     Do you have housing? : Yes     Are you worried about losing your housing?: No            Past Medical History:     Past Medical History:   Diagnosis Date    Heart disease               Past Surgical History:     Past Surgical History:   Procedure Laterality Date    CARDIAC SURGERY      COLONOSCOPY N/A 08/20/2020    Procedure: Colonoscopy;  Surgeon: Ean Gerard MD;  Location:  GI              Allergies:   Patient has no known allergies.       Data:   All laboratory data reviewed:    Recent Labs   Lab Test 08/27/24  0852 07/06/23  0900 08/04/22  0757   LDL 76 72 59   HDL 40 46 41   NHDL 98 100 86   CHOL 138 146 127   TRIG 111 141 137       Lab Results   Component Value Date    WBC 4.9 09/03/2024    RBC 3.82 (L) 09/03/2024    HGB 12.4 (L) 09/03/2024    HGB 11.8 (L) 06/04/2008    HCT 37.2 (L) 09/03/2024    HCT 34.9 (L) 06/04/2008    MCV 97 09/03/2024    MCH 32.5 09/03/2024    MCHC 33.3 09/03/2024    RDW 13.0 09/03/2024     09/03/2024     06/04/2008       Lab Results   Component Value Date     08/27/2024     08/03/2020    POTASSIUM 4.1 08/27/2024    POTASSIUM 4.3 08/04/2022     "POTASSIUM 4.6 08/03/2020    CHLORIDE 105 08/27/2024    CHLORIDE 107 08/04/2022    CHLORIDE 112 (H) 08/03/2020    CO2 20 (L) 08/27/2024    CO2 28 08/04/2022    CO2 27 08/03/2020    ANIONGAP 13 08/27/2024    ANIONGAP 1 (L) 08/04/2022    ANIONGAP 3 08/03/2020     (H) 08/27/2024     (H) 08/04/2022     (H) 08/03/2020    BUN 17.6 08/27/2024    BUN 17 08/04/2022    BUN 21 08/03/2020    CR 1.05 08/27/2024    CR 1.03 08/03/2020    GFRESTIMATED 76 08/27/2024    GFRESTIMATED 75 08/03/2020    GFRESTBLACK 87 08/03/2020    RICHAR 9.1 08/27/2024    RICHAR 9.5 08/03/2020      Lab Results   Component Value Date    AST 30 08/27/2024    AST 28 08/03/2020    ALT 36 08/27/2024    ALT 49 08/03/2020       Lab Results   Component Value Date    A1C 6.1 (H) 09/03/2024       No results found for: \"INR\"      SAJAN GARDNER MD  [facility]  "

## 2024-09-21 DIAGNOSIS — I10 BENIGN ESSENTIAL HYPERTENSION: ICD-10-CM

## 2024-09-23 RX ORDER — RAMIPRIL 5 MG/1
5 CAPSULE ORAL DAILY
Qty: 90 CAPSULE | Refills: 1 | Status: SHIPPED | OUTPATIENT
Start: 2024-09-23

## 2024-09-24 DIAGNOSIS — I10 BENIGN ESSENTIAL HYPERTENSION: ICD-10-CM

## 2024-09-24 DIAGNOSIS — M25.561 CHRONIC PAIN OF RIGHT KNEE: ICD-10-CM

## 2024-09-24 DIAGNOSIS — G89.29 CHRONIC PAIN OF RIGHT KNEE: ICD-10-CM

## 2024-09-24 NOTE — TELEPHONE ENCOUNTER
Patient is needing refills added for his metoprolol and he would like a 90 day  supply of the meloxicam if you approve of that request.    April PEARLO/

## 2024-09-25 RX ORDER — METOPROLOL SUCCINATE 50 MG/1
50 TABLET, EXTENDED RELEASE ORAL DAILY
Qty: 90 TABLET | Refills: 3 | Status: SHIPPED | OUTPATIENT
Start: 2024-09-25

## 2024-09-25 RX ORDER — MELOXICAM 15 MG/1
15 TABLET ORAL DAILY
Qty: 90 TABLET | Refills: 0 | Status: SHIPPED | OUTPATIENT
Start: 2024-09-25

## 2024-12-22 DIAGNOSIS — E78.2 MIXED HYPERLIPIDEMIA: ICD-10-CM

## 2024-12-23 RX ORDER — ROSUVASTATIN CALCIUM 40 MG/1
40 TABLET, COATED ORAL DAILY
Qty: 90 TABLET | Refills: 0 | Status: SHIPPED | OUTPATIENT
Start: 2024-12-23

## 2024-12-24 RX ORDER — EZETIMIBE 10 MG/1
10 TABLET ORAL DAILY
Qty: 90 TABLET | Refills: 3 | Status: SHIPPED | OUTPATIENT
Start: 2024-12-24

## 2024-12-24 RX ORDER — GEMFIBROZIL 600 MG/1
TABLET, FILM COATED ORAL
Qty: 180 TABLET | Refills: 2 | Status: SHIPPED | OUTPATIENT
Start: 2024-12-24

## 2024-12-30 DIAGNOSIS — G89.29 CHRONIC PAIN OF RIGHT KNEE: ICD-10-CM

## 2024-12-30 DIAGNOSIS — M25.561 CHRONIC PAIN OF RIGHT KNEE: ICD-10-CM

## 2024-12-30 RX ORDER — MELOXICAM 15 MG/1
15 TABLET ORAL DAILY
Qty: 90 TABLET | Refills: 0 | Status: SHIPPED | OUTPATIENT
Start: 2024-12-30

## 2025-02-20 DIAGNOSIS — M25.561 CHRONIC PAIN OF RIGHT KNEE: ICD-10-CM

## 2025-02-20 DIAGNOSIS — E78.2 MIXED HYPERLIPIDEMIA: ICD-10-CM

## 2025-02-20 DIAGNOSIS — G89.29 CHRONIC PAIN OF RIGHT KNEE: ICD-10-CM

## 2025-02-20 DIAGNOSIS — I10 BENIGN ESSENTIAL HYPERTENSION: ICD-10-CM

## 2025-02-20 RX ORDER — ROSUVASTATIN CALCIUM 40 MG/1
40 TABLET, COATED ORAL DAILY
Qty: 90 TABLET | Refills: 1 | Status: SHIPPED | OUTPATIENT
Start: 2025-02-20

## 2025-02-20 RX ORDER — RAMIPRIL 5 MG/1
5 CAPSULE ORAL DAILY
Qty: 90 CAPSULE | Refills: 0 | Status: SHIPPED | OUTPATIENT
Start: 2025-02-20

## 2025-02-20 RX ORDER — MELOXICAM 15 MG/1
15 TABLET ORAL DAILY
Qty: 90 TABLET | Refills: 0 | Status: SHIPPED | OUTPATIENT
Start: 2025-02-20

## 2025-05-18 DIAGNOSIS — G89.29 CHRONIC PAIN OF RIGHT KNEE: ICD-10-CM

## 2025-05-18 DIAGNOSIS — M25.561 CHRONIC PAIN OF RIGHT KNEE: ICD-10-CM

## 2025-05-19 RX ORDER — MELOXICAM 15 MG/1
15 TABLET ORAL DAILY
Qty: 90 TABLET | Refills: 0 | Status: SHIPPED | OUTPATIENT
Start: 2025-05-19

## 2025-05-22 DIAGNOSIS — I10 BENIGN ESSENTIAL HYPERTENSION: ICD-10-CM

## 2025-05-22 RX ORDER — RAMIPRIL 5 MG/1
5 CAPSULE ORAL DAILY
Qty: 90 CAPSULE | Refills: 0 | Status: SHIPPED | OUTPATIENT
Start: 2025-05-22

## 2025-05-24 DIAGNOSIS — M25.561 CHRONIC PAIN OF RIGHT KNEE: ICD-10-CM

## 2025-05-24 DIAGNOSIS — G89.29 CHRONIC PAIN OF RIGHT KNEE: ICD-10-CM

## 2025-05-27 RX ORDER — MELOXICAM 15 MG/1
15 TABLET ORAL DAILY
Qty: 90 TABLET | Refills: 0 | OUTPATIENT
Start: 2025-05-27

## 2025-06-21 DIAGNOSIS — E78.2 MIXED HYPERLIPIDEMIA: ICD-10-CM

## 2025-06-23 RX ORDER — ROSUVASTATIN CALCIUM 40 MG/1
40 TABLET, COATED ORAL DAILY
Qty: 90 TABLET | Refills: 1 | OUTPATIENT
Start: 2025-06-23

## 2025-06-30 ENCOUNTER — HOSPITAL ENCOUNTER (OUTPATIENT)
Dept: ULTRASOUND IMAGING | Facility: CLINIC | Age: 70
Discharge: HOME | End: 2025-06-30
Attending: INTERNAL MEDICINE | Admitting: INTERNAL MEDICINE
Payer: MEDICARE

## 2025-06-30 DIAGNOSIS — I71.40 ABDOMINAL AORTIC ANEURYSM (AAA) WITHOUT RUPTURE, UNSPECIFIED PART: ICD-10-CM

## 2025-06-30 PROCEDURE — 93978 VASCULAR STUDY: CPT

## 2025-07-01 ENCOUNTER — CARE COORDINATION (OUTPATIENT)
Dept: CARDIOLOGY | Facility: CLINIC | Age: 70
End: 2025-07-01
Payer: MEDICARE

## 2025-07-01 NOTE — PROGRESS NOTES
Abdominal aortic US from 6/30 noted. Pt has his annual visit in October. Will route  an update on results. Niraj VILLALOBOS July 1, 2025, 2:50 PM    IMPRESSION:  1.  Borderline enlarged distal abdominal aortic aneurysm now measuring 3.9 x 3.8 cm; would recommend repeat evaluation in a 3 year interval.  2.  Stable proximal and mid abdominal aortic aneurysm measuring up to 3.0 cm.     REFERENCE:  [facility] practice guidelines on the care of patients with an abdominal aortic aneurysm. Chel LOZANO. J Vasc Surg, 2018. PMID: [id number].     Aorta size: 3.0 cm to 3.9 cm: Surveillance imaging at 3-year intervals.

## 2025-07-15 NOTE — PROGRESS NOTES
[employer] message sent to patient with provider recommendations.  Catina Waggoner RN on 7/15/2025 at 4:21 PM

## 2025-07-15 NOTE — TELEPHONE ENCOUNTER
Mild enlargement of the distal abdominal aorta, measuring 3.9 cm in maximum dimension.  Guidelines suggest follow-up imaging in 3 years.  We can discuss this further at his visit in October.  Jonathan Schneider MD

## 2025-07-21 ENCOUNTER — PATIENT OUTREACH (OUTPATIENT)
Dept: CARE COORDINATION | Facility: CLINIC | Age: 70
End: 2025-07-21
Payer: MEDICARE

## 2025-08-21 DIAGNOSIS — I10 BENIGN ESSENTIAL HYPERTENSION: ICD-10-CM

## 2025-08-21 RX ORDER — RAMIPRIL 5 MG/1
5 CAPSULE ORAL DAILY
Qty: 90 CAPSULE | Refills: 0 | Status: SHIPPED | OUTPATIENT
Start: 2025-08-21